# Patient Record
Sex: FEMALE | Race: BLACK OR AFRICAN AMERICAN | Employment: FULL TIME | URBAN - METROPOLITAN AREA
[De-identification: names, ages, dates, MRNs, and addresses within clinical notes are randomized per-mention and may not be internally consistent; named-entity substitution may affect disease eponyms.]

---

## 2017-02-11 ENCOUNTER — HOSPITAL ENCOUNTER (OUTPATIENT)
Dept: ULTRASOUND IMAGING | Age: 32
Discharge: HOME | End: 2017-02-11
Attending: INTERNAL MEDICINE
Payer: COMMERCIAL

## 2017-02-11 DIAGNOSIS — R79.89 ELEVATED LFTS: ICD-10-CM

## 2017-02-11 PROCEDURE — 76705 ECHO EXAM OF ABDOMEN: CPT

## 2017-03-02 ENCOUNTER — HOSPITAL ENCOUNTER (OUTPATIENT)
Dept: ULTRASOUND IMAGING | Age: 32
Discharge: HOME | End: 2017-03-02
Attending: INTERNAL MEDICINE
Payer: COMMERCIAL

## 2017-03-02 VITALS
HEART RATE: 93 BPM | TEMPERATURE: 98.1 F | DIASTOLIC BLOOD PRESSURE: 89 MMHG | SYSTOLIC BLOOD PRESSURE: 122 MMHG | OXYGEN SATURATION: 100 % | RESPIRATION RATE: 16 BRPM | HEIGHT: 60 IN | BODY MASS INDEX: 28.86 KG/M2 | WEIGHT: 147 LBS

## 2017-03-02 DIAGNOSIS — R79.89 ELEVATED LFTS: ICD-10-CM

## 2017-03-02 PROCEDURE — 74011250636 HC RX REV CODE- 250/636: Performed by: RADIOLOGY

## 2017-03-02 PROCEDURE — 88307 TISSUE EXAM BY PATHOLOGIST: CPT | Performed by: INTERNAL MEDICINE

## 2017-03-02 PROCEDURE — 47000 NEEDLE BIOPSY OF LIVER PERQ: CPT

## 2017-03-02 PROCEDURE — 77030003503 HC NDL BIOP TISS BD -B

## 2017-03-02 PROCEDURE — 74011000250 HC RX REV CODE- 250: Performed by: RADIOLOGY

## 2017-03-02 PROCEDURE — 88313 SPECIAL STAINS GROUP 2: CPT | Performed by: INTERNAL MEDICINE

## 2017-03-02 RX ORDER — ALBUTEROL SULFATE 90 UG/1
2 AEROSOL, METERED RESPIRATORY (INHALATION) AS NEEDED
COMMUNITY

## 2017-03-02 RX ORDER — FENTANYL CITRATE 50 UG/ML
200 INJECTION, SOLUTION INTRAMUSCULAR; INTRAVENOUS
Status: DISCONTINUED | OUTPATIENT
Start: 2017-03-02 | End: 2017-03-06 | Stop reason: HOSPADM

## 2017-03-02 RX ORDER — LIDOCAINE HYDROCHLORIDE 10 MG/ML
1 INJECTION INFILTRATION; PERINEURAL
Status: COMPLETED | OUTPATIENT
Start: 2017-03-02 | End: 2017-03-02

## 2017-03-02 RX ORDER — SODIUM CHLORIDE 9 MG/ML
25 INJECTION, SOLUTION INTRAVENOUS CONTINUOUS
Status: DISCONTINUED | OUTPATIENT
Start: 2017-03-02 | End: 2017-03-06 | Stop reason: HOSPADM

## 2017-03-02 RX ORDER — MIDAZOLAM HYDROCHLORIDE 1 MG/ML
5 INJECTION, SOLUTION INTRAMUSCULAR; INTRAVENOUS
Status: DISCONTINUED | OUTPATIENT
Start: 2017-03-02 | End: 2017-03-06 | Stop reason: HOSPADM

## 2017-03-02 RX ADMIN — LIDOCAINE HYDROCHLORIDE 1 ML: 10 INJECTION, SOLUTION INFILTRATION; PERINEURAL at 12:00

## 2017-03-02 RX ADMIN — FENTANYL CITRATE 25 MCG: 50 INJECTION, SOLUTION INTRAMUSCULAR; INTRAVENOUS at 11:10

## 2017-03-02 RX ADMIN — MIDAZOLAM HYDROCHLORIDE 1 MG: 1 INJECTION, SOLUTION INTRAMUSCULAR; INTRAVENOUS at 11:10

## 2017-03-02 RX ADMIN — MIDAZOLAM HYDROCHLORIDE 1 MG: 1 INJECTION, SOLUTION INTRAMUSCULAR; INTRAVENOUS at 11:00

## 2017-03-02 RX ADMIN — FENTANYL CITRATE 25 MCG: 50 INJECTION, SOLUTION INTRAMUSCULAR; INTRAVENOUS at 11:00

## 2017-03-02 RX ADMIN — MIDAZOLAM HYDROCHLORIDE 1 MG: 1 INJECTION, SOLUTION INTRAMUSCULAR; INTRAVENOUS at 11:14

## 2017-03-02 RX ADMIN — FENTANYL CITRATE 25 MCG: 50 INJECTION, SOLUTION INTRAMUSCULAR; INTRAVENOUS at 11:14

## 2017-03-02 RX ADMIN — SODIUM CHLORIDE 25 ML/HR: 900 INJECTION, SOLUTION INTRAVENOUS at 10:15

## 2017-03-02 RX ADMIN — MIDAZOLAM HYDROCHLORIDE 1 MG: 1 INJECTION, SOLUTION INTRAMUSCULAR; INTRAVENOUS at 11:05

## 2017-03-02 NOTE — DISCHARGE INSTRUCTIONS
BIOPSY DISCHARGE INSTRUCTIONS    General Instructions:     A biopsy is the removal of a small piece of tissue for microscopic examination or testing. Healthy tissue can be obtained for the purpose of tissue-type matching for transplants. Unhealthy tissues are more commonly biopsied to diagnose disease. Liver Biopsy: This test helps detect cancer, infections, and the cause of an enlargement of the liver or elevated liver enzymes. It also helps to diagnose a number of liver diseases. The pain associated with the procedure may be felt in the shoulder. The risks include internal bleeding, pneumothorax, and injury to the surrounding organs. Home Care Instructions: You may resume your regular diet and medication regimen. Do not drink alcohol, drive, or make any important legal decisions in the next 24 hours. Do not lift anything heavier than a gallon of milk until the soreness goes away. You may use over the counter acetaminophen or ibuprofen for the soreness. You may apply an ice pack to the affected area for 20-30 minutes at time for the first 24 hours. After that, you may apply a heat pack. Call If:     You should call your Physician and/or the Radiology Nurse if you have any questions or concerns about the biopsy site. Call if you should have increased pain, fever, redness, drainage, or bleeding more than a small spot on the bandage. Follow-Up Instructions: Please see your ordering doctor as he/she has requested. To Reach Us:    Should you experience any significant changes, please call [phone] between the hours of 7:30 am and 10 pm or [facility] number [phone] after hours.  After hours, ask the  to page the [address] Technologist, and describe the problem to the technologist.          Patient Signature:  Date: 3/2/2017  Discharging Nurse: Franck Hand RN

## 2017-03-02 NOTE — H&P
Interventional Radiology History and Physical (Outpatient)    3/2/2017    Patient: Awilda Gale 32 y.o. female     Referring Physician:  Minerva Ni MD    Chief Complaint: need liver bx    History of Present Illness: abnormal liver tests    History:  Past Medical History:   Diagnosis Date    Asthma      History reviewed. No pertinent family history. Social History     Social History    Marital status: SINGLE     Spouse name: N/A    Number of children: N/A    Years of education: N/A     Occupational History    Not on file. Social History Main Topics    Smoking status: Never Smoker    Smokeless tobacco: Not on file    Alcohol use Yes      Comment: rarely    Drug use: Not on file    Sexual activity: Not on file     Other Topics Concern    Not on file     Social History Narrative    No narrative on file       Allergies: Allergies   Allergen Reactions    Penicillin G Angioedema    Sulfa (Sulfonamide Antibiotics) Hives       Prior to Admission Medications:  Prior to Admission medications    Medication Sig Start Date End Date Taking? Authorizing Provider   albuterol (PROAIR HFA) 90 mcg/actuation inhaler Take 2 Puffs by inhalation as needed for Wheezing. Indications: Acute Asthma Attack   Yes Historical Provider       Physical Exam:    Blood pressure 122/89, pulse 93, temperature 98.1 °F (36.7 °C), resp. rate 16, height 5' (1.524 m), weight 66.7 kg (147 lb), SpO2 100 %. General: alert, cooperative, no distress, appears stated age  Heart: rrr  Lungs: clear to auscultation bilaterally  Abdomen: soft  Neuro: grossly intact  Extremities: extremities wnl    Plan of Care/Planned Procedure:  Risks, benefits, and alternatives reviewed with patient and she agrees to proceed with the procedure.      Francisco J Fontaine MD

## 2017-04-04 ENCOUNTER — TELEPHONE (OUTPATIENT)
Dept: HEMATOLOGY | Age: 32
End: 2017-04-04

## 2017-04-10 ENCOUNTER — OFFICE VISIT (OUTPATIENT)
Dept: HEMATOLOGY | Age: 32
End: 2017-04-10

## 2017-04-10 VITALS
BODY MASS INDEX: 28.49 KG/M2 | HEART RATE: 97 BPM | SYSTOLIC BLOOD PRESSURE: 133 MMHG | OXYGEN SATURATION: 99 % | DIASTOLIC BLOOD PRESSURE: 97 MMHG | HEIGHT: 60 IN | TEMPERATURE: 96.9 F | WEIGHT: 145.13 LBS

## 2017-04-10 DIAGNOSIS — K74.3 PRIMARY BILIARY CHOLANGITIS (HCC): Primary | ICD-10-CM

## 2017-04-10 DIAGNOSIS — L29.9 ITCHING: Primary | ICD-10-CM

## 2017-04-10 PROBLEM — J45.909 ASTHMA: Status: ACTIVE | Noted: 2017-04-10

## 2017-04-10 PROBLEM — F32.A DEPRESSION: Status: ACTIVE | Noted: 2017-04-10

## 2017-04-10 RX ORDER — CYPROHEPTADINE HYDROCHLORIDE 4 MG/1
4 TABLET ORAL
Qty: 90 TAB | Refills: 3 | Status: SHIPPED | OUTPATIENT
Start: 2017-04-10 | End: 2017-07-09

## 2017-04-10 RX ORDER — ALPRAZOLAM 0.5 MG/1
TABLET ORAL
COMMUNITY

## 2017-04-10 RX ORDER — ESCITALOPRAM OXALATE 10 MG/1
10 TABLET ORAL DAILY
COMMUNITY

## 2017-04-10 RX ORDER — URSODIOL 300 MG/1
900 CAPSULE ORAL DAILY
Qty: 90 CAP | Refills: 6 | Status: SHIPPED | OUTPATIENT
Start: 2017-04-10 | End: 2017-11-24 | Stop reason: SDUPTHER

## 2017-04-10 NOTE — PROGRESS NOTES
93 Nurys Novak MD, [address]     April JOSH Arrieta PA-C Lucrecia Primmer, MD, [address], MD Munira Rojo NP Rip Snuffer, NP        [address] [facility], [address] [facility], [place]  [address].     [phone]     FAX: [address], [address] [facility], [address]     [phone]     FAX: [phone]       Patient Care Team:  Jose Carty MD as PCP - General (Family Practice)  Melvin Akins MD (Gastroenterology)      Problem List  Date Reviewed: 4/10/2017          Codes Class Noted    Primary biliary cholangitis [facility]) ICD-10-CM: K74.3  ICD-9-CM: 571.6  4/10/2017        Depression ICD-10-CM: F32.9  ICD-9-CM: 893  4/10/2017        Asthma ICD-10-CM: N88.889  ICD-9-CM: 493.90  4/10/2017                The physicians listed above have asked me to see Kajal Gamal in consultation regarding Primary Biliary Cirrhosis. All medical records sent by the referring physicians were reviewed including imaging studies and pathology. The patient is a 32 y.o. Black female who was noted to have abnormalities in liver transaminases and alkaline phosphase in 2/2017. Serologic studies were positive for AMA. Ultrasound of the liver was performed in 2/2017. The results of the imaging demonstrated a normal appearing liver. A liver biopsy was performed in 3/2017. This demonstrated mild inflammation, bile duct changes consistent with PBC and no fibrosis. The patient has been not been treated with ursodeoxycholic acid to date. The most recent laboratory studies indicate that the liver transaminases are elevated, ALP is elevated, tests of hepatic synthetic and metabolic function are   normal, and the platelet count is normal.    The patient notes fatigue, dry eyes, dry mouth, itching, hair loss.     The patient completes all daily activities without any functional limitations. The patient has not experienced yellowing of the eyes or skin, mylagias, arthralgias. ALLERGIES  Allergies   Allergen Reactions    Penicillin G Angioedema    Sulfa (Sulfonamide Antibiotics) Hives       MEDICATIONS  Current Outpatient Prescriptions   Medication Sig    escitalopram oxalate (LEXAPRO) 10 mg tablet Take 10 mg by mouth daily.  ALPRAZolam (XANAX) 0.5 mg tablet Take  by mouth.  albuterol (PROAIR HFA) 90 mcg/actuation inhaler Take 2 Puffs by inhalation as needed for Wheezing. Indications: Acute Asthma Attack     No current facility-administered medications for this visit. SYSTEM REVIEW NOT RELATED TO LIVER DISEASE OR REVIEWED ABOVE:  Constitution systems: Negative for fever, chills, weight gain, weight loss. Eyes: Negative for visual changes. ENT: Negative for sore throat, painful swallowing. Respiratory: Negative for cough, hemoptysis, SOB. Cardiology: Negative for chest pain, palpitations. GI:  Negative for constipation or diarrhea. : Negative for urinary frequency, dysuria, hematuria, nocturia. Skin: Negative for rash. Hematology: Negative for easy bruising, blood clots. Musculo-skelatal: Negative for back pain, muscle pain, weakness. Neurologic: Negative for headaches, dizziness, vertigo, memory problems not related to HE. Psychology: Negative for anxiety, depression. FAMILY HISTORY:  The father has the following chronic diseases: DM and has some sort of liver problem. The mother has the following chronic diseases: CHOL, HTN. There is no family history of immune disorders. SOCIAL HISTORY:  The patient has never been . The patient has no children. The patient has never used tobacco products. The patient consumes alcohol on social occasions never in excess. The patient currently works full time 3rd .         PHYSICAL EXAMINATION:  BP (!) 133/97  Pulse 97  Temp 96.9 °F (36.1 °C) (Tympanic)   Ht 5' (1.524 m)  Wt 145 lb 2 oz (65.8 kg)  SpO2 99%  BMI 28.34 kg/m2  General: No acute distress. Eyes: Sclera anicteric. ENT: No oral lesions. Thyroid normal.  Nodes: No adenopathy. Skin: No spider angiomata. No jaundice. No palmar erythema. Respiratory: Lungs clear to auscultation. Cardiovascular: Regular heart rate. No murmurs. No JVD. Abdomen: Soft non-tender. Liver size normal to percussion/palpation. Spleen not palpable. No obvious ascites. Extremities: No edema. No muscle wasting. No gross arthritic changes. Neurologic: Alert and oriented. Cranial nerves grossly intact. No asterixis. LABORATORY STUDIES:  From 2/2017  AST/ALT/ALP/T Bili/ALB:  116/139/474/0.7/4.1  WBC/HB/PLT/INR:   9.5/10.3/605  BUN/CREAT:  11/0.9    SEROLOGIES:  2/2017. NYASIA positive, ASMA positive, AMA positive. LIVER HISTOLOGY:  3/2017. Portal inflammation. Bile duct injury consistent with PBC. No fibrosis. Biopsy slides not reviewed by MLS. ENDOSCOPIC PROCEDURES:  Not available or performed    RADIOLOGY:  2/2017. Ultrasound of liver. Normal appearing liver. No liver mass lesions. 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 Liver transaminases are elevated. Alkaline phosphate is elevated. Liver function is normal.  The platelet count is normal.      The patient is not currently receiving treatment for the liver disease. Will start MARIBELL at 900 mg every day. The patient had a liver biopsy performed in [facility]. Will request that the liver biopsy slides sent to me for my personal review. At some point will get a Fiboscan which we can repeat every 2 years to assess for lack of fibrosis progression. The patient was directed to continue all current medications at the current dosages.   There are no contraindications for the patient to take any medications that are necessary for treatment of other medical issues. The patient was counseled regarding alcohol consumption. The need for vaccination against viral hepatitis A and B will be assessed with serologic and instituted as appropriate. Anemia may be secondary to low iron stores. Will check ferritin and iron panel. If iron deficient will supplement with oral iron and evaluate for GI blood loss. All of the above issues were discussed with the patient. All questions were answered. The patient expressed a clear understanding of the above. [address] [address] in 4 weeks to assess tolerability of and response to MARIBELL.     Ramón Frederick MD  Liver [place] [address].  [phone]

## 2017-04-10 NOTE — MR AVS SNAPSHOT
Visit Information Date & Time Provider Department Dept. Phone Encounter #  
 4/10/2017  1:30 PM Cally Carvajal MD [facility] of [address] [place] [address] [id number] Follow-up Instructions Return in about 4 weeks (around 5/8/2017) for April. Upcoming Health Maintenance Date Due DTaP/Tdap/Td series (1 - Tdap) 11/26/2006 PAP AKA CERVICAL CYTOLOGY 11/26/2006 INFLUENZA AGE 9 TO ADULT 8/1/2016 Allergies as of 4/10/2017  Review Complete On: 4/10/2017 By: Yadi Ness LPN Severity Noted Reaction Type Reactions Penicillin G  03/02/2017    Angioedema Sulfa (Sulfonamide Antibiotics)  03/02/2017    Hives Current Immunizations  Never Reviewed No immunizations on file. Not reviewed this visit Vitals BP Pulse Temp Height(growth percentile) Weight(growth percentile) SpO2  
 (!) 133/97 97 96.9 °F (36.1 °C) (Tympanic) 5' (1.524 m) 145 lb 2 oz (65.8 kg) 99% BMI Smoking Status 28.34 kg/m2 Never Smoker BMI and BSA Data Body Mass Index Body Surface Area  
 28.34 kg/m 2 1.67 m 2 Preferred Pharmacy Pharmacy Name Phone [facility]/PHARMACY #[id number] SAL [address]/ [address]. [facility] [phone] Your Updated Medication List  
  
   
This list is accurate as of: 4/10/17  2:48 PM.  Always use your most recent med list.  
  
  
  
  
 LEXAPRO 10 mg tablet Generic drug:  escitalopram oxalate Take 10 mg by mouth daily. PROAIR HFA 90 mcg/actuation inhaler Generic drug:  albuterol Take 2 Puffs by inhalation as needed for Wheezing. Indications: Acute Asthma Attack  
  
 ursodiol 300 mg capsule Commonly known as:  ACTIGALL Take 3 Caps by mouth daily. XANAX 0.5 mg tablet Generic drug:  ALPRAZolam  
Take  by mouth. Prescriptions Sent to Pharmacy Refills  
 ursodiol (ACTIGALL) 300 mg capsule 6 Sig: Take 3 Caps by mouth daily. Class: Normal  
 Pharmacy: [facility]/pharmacy #[id number] - [address]  #: [phone] Route: Oral  
  
Follow-up Instructions Return in about 4 weeks (around 5/8/2017) for April. Introducing [facility]! Tanis Epley introduces YouScribe patient portal. Now you can access parts of your medical record, email your doctor's office, and request medication refills online. 1. In your internet browser, go to [email or web address]¢/[email or web address] 2. Click on the First Time User? Click Here link in the Sign In box. You will see the New Member Sign Up page. 3. Enter your YouScribe Access Code exactly as it appears below. You will not need to use this code after youve completed the sign-up process. If you do not sign up before the expiration date, you must request a new code. · YouScribe Access Code: [email or web address] Expires: 5/10/2017  2:18 PM 
 
4. Enter the last four digits of your Social Security Number (xxxx) and Date of Birth (mm/dd/yyyy) as indicated and click Submit. You will be taken to the next sign-up page. 5. Create a YouScribe ID. This will be your YouScribe login ID and cannot be changed, so think of one that is secure and easy to remember. 6. Create a YouScribe password. You can change your password at any time. 7. Enter your Password Reset Question and Answer. This can be used at a later time if you forget your password. 8. Enter your e-mail address. You will receive e-mail notification when new information is available in [address] 9. Click Sign Up. You can now view and download portions of your medical record. 10. Click the Download Summary menu link to download a portable copy of your medical information. If you have questions, please visit the Frequently Asked Questions section of the YouScribe website. Remember, YouScribe is NOT to be used for urgent needs. For medical emergencies, dial 911. Now available from your iPhone and Android! Please provide this summary of care documentation to your next provider. Your primary care clinician is listed as Dion Osuna. If you have any questions after today's visit, please call [phone].

## 2017-06-13 ENCOUNTER — OFFICE VISIT (OUTPATIENT)
Dept: HEMATOLOGY | Age: 32
End: 2017-06-13

## 2017-06-13 VITALS
TEMPERATURE: 98.3 F | OXYGEN SATURATION: 96 % | DIASTOLIC BLOOD PRESSURE: 94 MMHG | BODY MASS INDEX: 28.75 KG/M2 | SYSTOLIC BLOOD PRESSURE: 140 MMHG | WEIGHT: 147.2 LBS | HEART RATE: 90 BPM

## 2017-06-13 DIAGNOSIS — K74.3 PRIMARY BILIARY CHOLANGITIS (HCC): Primary | ICD-10-CM

## 2017-06-13 RX ORDER — CETIRIZINE HCL 10 MG
TABLET ORAL
COMMUNITY

## 2017-06-13 NOTE — MR AVS SNAPSHOT
Visit Information Date & Time Provider Department Dept. Phone Encounter #  
 6/13/2017  2:30 PM MARIA E Montague Liver [facility] [phone] [id number] Follow-up Instructions Return in about 3 months (around 9/13/2017) for [address] Upcoming Health Maintenance Date Due Pneumococcal 19-64 Medium Risk (1 of 1 - PPSV23) 11/26/2004 DTaP/Tdap/Td series (1 - Tdap) 11/26/2006 PAP AKA CERVICAL CYTOLOGY 11/26/2006 INFLUENZA AGE 9 TO ADULT 8/1/2017 Allergies as of 6/13/2017  Review Complete On: 6/13/2017 By: Savannah Dockery Severity Noted Reaction Type Reactions Penicillin G  03/02/2017    Angioedema Sulfa (Sulfonamide Antibiotics)  03/02/2017    Hives Current Immunizations  Never Reviewed No immunizations on file. Not reviewed this visit You Were Diagnosed With   
  
 Codes Comments Primary biliary cholangitis (HCC)    -  Primary ICD-10-CM: A33.3 ICD-9-CM: 571.6 Vitals BP Pulse Temp Weight(growth percentile) SpO2 BMI  
 (!) 140/94 90 98.3 °F (36.8 °C) (Oral) 147 lb 3.2 oz (66.8 kg) 96% 28.75 kg/m2 Smoking Status Never Smoker BMI and BSA Data Body Mass Index Body Surface Area 28.75 kg/m 2 1.68 m 2 Preferred Pharmacy Pharmacy Name Phone [facility]/PHARMACY #[id number] [address] [facility] [phone] Your Updated Medication List  
  
   
This list is accurate as of: 6/13/17  3:39 PM.  Always use your most recent med list.  
  
  
  
  
 cyproheptadine 4 mg tablet Commonly known as:  PERIACTIN Take 1 Tab by mouth three (3) times daily as needed for up to 90 days. LEXAPRO 10 mg tablet Generic drug:  escitalopram oxalate Take 10 mg by mouth daily. PROAIR HFA 90 mcg/actuation inhaler Generic drug:  albuterol Take 2 Puffs by inhalation as needed for Wheezing.  Indications: Acute Asthma Attack  
  
 ursodiol 300 mg capsule Commonly known as:  ACTIGALL Take 3 Caps by mouth daily. XANAX 0.5 mg tablet Generic drug:  ALPRAZolam  
Take  by mouth. ZyrTEC 10 mg tablet Generic drug:  cetirizine Take  by mouth. We Performed the Following CBC W/O DIFF [16978 CPT(R)] HEPATIC FUNCTION PANEL (6) [[id number] Custom] IRON PROFILE [id number] CPT(R)] Follow-up Instructions Return in about 3 months (around 9/13/2017) for [address] To-Do List   
 09/15/2017 1:00 PM  
  Appointment with MARIA E Carvajal at [address] ([phone]) Introducing [facility]! [employer] introduces Shop pirate patient portal. Now you can access parts of your medical record, email your doctor's office, and request medication refills online. 1. In your internet browser, go to [email or web address] 2. Click on the First Time User? Click Here link in the Sign In box. You will see the New Member Sign Up page. 3. Enter your Shop pirate Access Code exactly as it appears below. You will not need to use this code after youve completed the sign-up process. If you do not sign up before the expiration date, you must request a new code. · Shop pirate Access Code: [email or web address] Expires: 9/11/2017  3:32 PM 
 
4. Enter the last four digits of your Social Security Number (xxxx) and Date of Birth (mm/dd/yyyy) as indicated and click Submit. You will be taken to the next sign-up page. 5. Create a Cuet ID. This will be your Shop pirate login ID and cannot be changed, so think of one that is secure and easy to remember. 6. Create a Cuet password. You can change your password at any time. 7. Enter your Password Reset Question and Answer. This can be used at a later time if you forget your password. 8. Enter your e-mail address. You will receive e-mail notification when new information is available in [address] 9. Click Sign Up. You can now view and download portions of your medical record. 10. Click the Download Summary menu link to download a portable copy of your medical information. If you have questions, please visit the Frequently Asked Questions section of the [employer] website. Remember, [employer] is NOT to be used for urgent needs. For medical emergencies, dial 911. Now available from your iPhone and Android! Please provide this summary of care documentation to your next provider. Your primary care clinician is listed as Gregorio Giang. If you have any questions after today's visit, please call [phone].

## 2017-06-14 LAB
ALBUMIN SERPL-MCNC: 3.9 G/DL (ref 3.5–5.5)
ALP SERPL-CCNC: 487 IU/L (ref 39–117)
ALT SERPL-CCNC: 71 IU/L (ref 0–32)
AST SERPL-CCNC: 70 IU/L (ref 0–40)
BILIRUB DIRECT SERPL-MCNC: 0.63 MG/DL (ref 0–0.4)
BILIRUB SERPL-MCNC: 0.9 MG/DL (ref 0–1.2)
ERYTHROCYTE [DISTWIDTH] IN BLOOD BY AUTOMATED COUNT: 18.1 % (ref 12.3–15.4)
HCT VFR BLD AUTO: 34.1 % (ref 34–46.6)
HGB BLD-MCNC: 10.9 G/DL (ref 11.1–15.9)
IRON SATN MFR SERPL: 14 % (ref 15–55)
IRON SERPL-MCNC: 64 UG/DL (ref 27–159)
MCH RBC QN AUTO: 27.7 PG (ref 26.6–33)
MCHC RBC AUTO-ENTMCNC: 32 G/DL (ref 31.5–35.7)
MCV RBC AUTO: 87 FL (ref 79–97)
PLATELET # BLD AUTO: 600 X10E3/UL (ref 150–379)
RBC # BLD AUTO: 3.93 X10E6/UL (ref 3.77–5.28)
TIBC SERPL-MCNC: 454 UG/DL (ref 250–450)
UIBC SERPL-MCNC: 390 UG/DL (ref 131–425)
WBC # BLD AUTO: 9.5 X10E3/UL (ref 3.4–10.8)

## 2017-10-10 ENCOUNTER — APPOINTMENT (OUTPATIENT)
Dept: ULTRASOUND IMAGING | Age: 32
End: 2017-10-10
Attending: PHYSICIAN ASSISTANT
Payer: COMMERCIAL

## 2017-10-10 ENCOUNTER — HOSPITAL ENCOUNTER (EMERGENCY)
Age: 32
Discharge: HOME | End: 2017-10-10
Attending: EMERGENCY MEDICINE
Payer: COMMERCIAL

## 2017-10-10 ENCOUNTER — APPOINTMENT (OUTPATIENT)
Dept: CT IMAGING | Age: 32
End: 2017-10-10
Attending: PHYSICIAN ASSISTANT
Payer: COMMERCIAL

## 2017-10-10 VITALS
BODY MASS INDEX: 28.51 KG/M2 | HEART RATE: 78 BPM | WEIGHT: 145.25 LBS | OXYGEN SATURATION: 96 % | RESPIRATION RATE: 16 BRPM | DIASTOLIC BLOOD PRESSURE: 71 MMHG | TEMPERATURE: 98.4 F | HEIGHT: 60 IN | SYSTOLIC BLOOD PRESSURE: 145 MMHG

## 2017-10-10 DIAGNOSIS — R10.9 RIGHT SIDED ABDOMINAL PAIN: Primary | ICD-10-CM

## 2017-10-10 LAB
ALBUMIN SERPL-MCNC: 3.2 G/DL (ref 3.5–5)
ALBUMIN/GLOB SERPL: 0.5 {RATIO} (ref 1.1–2.2)
ALP SERPL-CCNC: 944 U/L (ref 45–117)
ALT SERPL-CCNC: 140 U/L (ref 12–78)
ANION GAP SERPL CALC-SCNC: 9 MMOL/L (ref 5–15)
AST SERPL-CCNC: 156 U/L (ref 15–37)
BILIRUB SERPL-MCNC: 2.3 MG/DL (ref 0.2–1)
BUN SERPL-MCNC: 7 MG/DL (ref 6–20)
BUN/CREAT SERPL: 8 (ref 12–20)
CALCIUM SERPL-MCNC: 9.1 MG/DL (ref 8.5–10.1)
CHLORIDE SERPL-SCNC: 103 MMOL/L (ref 97–108)
CLUE CELLS VAG QL WET PREP: NORMAL
CO2 SERPL-SCNC: 26 MMOL/L (ref 21–32)
CREAT SERPL-MCNC: 0.84 MG/DL (ref 0.55–1.02)
GLOBULIN SER CALC-MCNC: 6.1 G/DL (ref 2–4)
GLUCOSE SERPL-MCNC: 91 MG/DL (ref 65–100)
KOH PREP SPEC: NORMAL
LIPASE SERPL-CCNC: 270 U/L (ref 73–393)
POTASSIUM SERPL-SCNC: 4 MMOL/L (ref 3.5–5.1)
PROT SERPL-MCNC: 9.3 G/DL (ref 6.4–8.2)
SERVICE CMNT-IMP: NORMAL
SODIUM SERPL-SCNC: 138 MMOL/L (ref 136–145)
T VAGINALIS VAG QL WET PREP: NORMAL

## 2017-10-10 PROCEDURE — 87491 CHLMYD TRACH DNA AMP PROBE: CPT | Performed by: EMERGENCY MEDICINE

## 2017-10-10 PROCEDURE — 87210 SMEAR WET MOUNT SALINE/INK: CPT | Performed by: EMERGENCY MEDICINE

## 2017-10-10 PROCEDURE — 99284 EMERGENCY DEPT VISIT MOD MDM: CPT

## 2017-10-10 PROCEDURE — 80053 COMPREHEN METABOLIC PANEL: CPT | Performed by: PHYSICIAN ASSISTANT

## 2017-10-10 PROCEDURE — 76705 ECHO EXAM OF ABDOMEN: CPT

## 2017-10-10 PROCEDURE — 83690 ASSAY OF LIPASE: CPT | Performed by: PHYSICIAN ASSISTANT

## 2017-10-10 PROCEDURE — 36415 COLL VENOUS BLD VENIPUNCTURE: CPT | Performed by: PHYSICIAN ASSISTANT

## 2017-10-10 RX ORDER — SODIUM CHLORIDE 0.9 % (FLUSH) 0.9 %
10 SYRINGE (ML) INJECTION
Status: DISCONTINUED | OUTPATIENT
Start: 2017-10-10 | End: 2017-10-10 | Stop reason: HOSPADM

## 2017-10-10 RX ORDER — CYPROHEPTADINE HYDROCHLORIDE 4 MG/1
4 TABLET ORAL
COMMUNITY
End: 2018-05-30 | Stop reason: SDUPTHER

## 2017-10-10 NOTE — LETTER
Mike. Sheela 55 
[address] [place], [place] [address] [address] 
[phone] Work/School Note Date: 10/10/2017 To Whom It May concern: 
 
Josué Huffman was seen and treated today in the emergency room by the following provider(s): 
Attending Provider: Sreekanth De Oliveira MD 
Physician Assistant: MARIA E Raymond. Josué Huffman may return to work on 10/12/17. Sincerely, MARIA E Raymond

## 2017-10-10 NOTE — ED TRIAGE NOTES
Right groin pain since this am, denies injury, denies n/v, denies fever, denies vaginal discharge or bleeding , pt stated she had blood work and ua test today and it was negative

## 2017-10-10 NOTE — ED PROVIDER NOTES
HPI Comments: 33 y/o female with PMHx of asthma and primary biliary cholangitis, presenting with complaint of right-sided abdominal pain. She states the pain began around 10:00 this morning, and felt similar to when she had a UTI earlier this year. She was seen at [facility] earlier today and had labs done, including a negative UA and normal WBC. She also admits to not taking her Ursodiol for the past week. The pain was 6/10 earlier but is now 4/10, sharp, non-radiating. Aggravating factors include sitting upright; no alleviating factors. She endorses intermittent diarrhea at baseline. Denies fevers, chills, nausea, vomiting, dysuria, hematuria, urgency, frequency, light-headedness or syncope. The history is provided by the patient. Past Medical History:   Diagnosis Date    Asthma     Primary biliary cholangitis ([facility] [address]        Past Surgical History:   Procedure Laterality Date    HX OTHER SURGICAL      liver bx    HX OTHER SURGICAL      oral surgery          History reviewed. No pertinent family history. Social History     Social History    Marital status: SINGLE     Spouse name: N/A    Number of children: N/A    Years of education: N/A     Occupational History    Not on file. Social History Main Topics    Smoking status: Never Smoker    Smokeless tobacco: Not on file    Alcohol use Yes      Comment: rarely    Drug use: Not on file    Sexual activity: Not on file     Other Topics Concern    Not on file     Social History Narrative         ALLERGIES: Penicillin g and Sulfa (sulfonamide antibiotics)    Review of Systems   Constitutional: Negative for chills and fever. Respiratory: Negative for shortness of breath. Cardiovascular: Negative for chest pain. Gastrointestinal: Positive for abdominal pain and diarrhea (intermittently). Negative for nausea and vomiting. Genitourinary: Negative for dysuria, frequency, hematuria and urgency. Musculoskeletal: Negative for myalgias. Neurological: Negative for syncope and light-headedness. All other systems reviewed and are negative. Vitals:    10/10/17 1424   BP: (!) 152/108   Pulse: 89   Resp: 16   Temp: 98.3 °F (36.8 °C)   SpO2: 95%   Weight: 65.9 kg (145 lb 4 oz)   Height: 5' (1.524 m)            Physical Exam   Constitutional: She is oriented to person, place, and time. She appears well-developed and well-nourished. No distress. HENT:   Head: Normocephalic and atraumatic. Eyes: Conjunctivae and EOM are normal.   Neck: Normal range of motion. Neck supple. Cardiovascular: Normal rate, regular rhythm and normal heart sounds. Pulmonary/Chest: Effort normal and breath sounds normal.   Abdominal: Soft. She exhibits no distension. There is tenderness (RUQ, RLQ). There is no rebound and no guarding. Genitourinary: Pelvic exam was performed with patient supine. There is no rash, tenderness or lesion on the right labia. There is no rash, tenderness or lesion on the left labia. Uterus is not tender. Cervix exhibits no motion tenderness, no discharge and no friability. Right adnexum displays no mass, no tenderness and no fullness. Left adnexum displays no mass, no tenderness and no fullness. No erythema, tenderness or bleeding in the vagina. No signs of injury around the vagina. Vaginal discharge found. Musculoskeletal: Normal range of motion. Neurological: She is alert and oriented to person, place, and time. Skin: Skin is warm and dry. She is not diaphoretic. Nursing note and vitals reviewed.        MDM  Number of Diagnoses or Management Options  Right sided abdominal pain:      Amount and/or Complexity of Data Reviewed  Clinical lab tests: reviewed and ordered  Tests in the radiology section of CPT®: reviewed and ordered  Review and summarize past medical records: yes (Labs, previous Hepatology notes)  Discuss the patient with other providers: yes (Dr. José Luis Granado, ED attending)    Patient Progress  Patient progress: stable    ED Course       Procedures    31 y/o female with PMHx of asthma and primary biliary cholangitis, presenting with complaint of right-sided abdominal pain. Based on history and exam as described above, DDx includes appendicitis, cholecystitis, hepatitis, pancreatitis. No adnexal tenderness, doubt ovarian cyst or torsion. Will obtain CT abd/pelvis w/ contrast, CBC, CMP, lipase, UA, urine preg, wet prep/KOH, GC/Chlamydia. Patient refusing labs, stating she already had labs done at [facility]. Labs from [facility] reviewed, significant for Cr 0.8, bicarb 28, UA with moderate BR but neg nitrites and leuks, WBC 9.1, Hgb 11.2, urine preg negative. Will proceed with CMP, lipase, wet prep, KOH and GC/Chlamydia. Progress note - 4:08 PM  LFT's significantly elevated (t-BR 2.3, , , alk phos 944). Will obtain RUQ ultrasound. Progress note - 5:37 PM  RUQ ultrasound reveals contracted gallbladder but no evidence of cholecystitis, no focal hepatic lesions, no hydronephrosis, no evidence of portal vein hypertension or thrombosis. Will consult Dr. Abimael Bueno (hepatology) to discuss labs and arrange follow up. Progress note - 6:55 PM  Awaiting call back from Dr. Abimael Bueno. Will re-page. Consult note - 7:37 PM  I have spoken with Dr. Abimael Bueno - labs consistent with medication non-compliance, but no explanation of right-sided pain. I reviewed test results with the patient, discussed that we have not ruled out appendicitis. Offered CT abd/pelvis, but the patient states she is hungry and is ready to go home. Agree with discharge disposition with plan for prompt follow up with Dr. Abimael uBeno. We discussed strict ED return precautions including fever, worsening pain, decreased appetite or vomiting. The patient verbalized understanding and agreement with this plan.

## 2017-10-10 NOTE — DISCHARGE INSTRUCTIONS

## 2017-10-11 LAB
C TRACH DNA SPEC QL NAA+PROBE: NEGATIVE
N GONORRHOEA DNA SPEC QL NAA+PROBE: NEGATIVE
SAMPLE TYPE: NORMAL
SERVICE CMNT-IMP: NORMAL
SPECIMEN SOURCE: NORMAL

## 2017-11-07 ENCOUNTER — OFFICE VISIT (OUTPATIENT)
Dept: HEMATOLOGY | Age: 32
End: 2017-11-07

## 2017-11-07 VITALS
HEART RATE: 93 BPM | DIASTOLIC BLOOD PRESSURE: 107 MMHG | WEIGHT: 139.8 LBS | BODY MASS INDEX: 27.3 KG/M2 | SYSTOLIC BLOOD PRESSURE: 141 MMHG | TEMPERATURE: 98.9 F

## 2017-11-07 DIAGNOSIS — K74.3 PRIMARY BILIARY CHOLANGITIS (HCC): Primary | ICD-10-CM

## 2017-11-07 NOTE — MR AVS SNAPSHOT
Visit Information Date & Time Provider Department Dept. Phone Encounter #  
 11/7/2017 11:00 AM Malini Skinner, [address] [place] [address] [id number] Upcoming Health Maintenance Date Due Pneumococcal 19-64 Medium Risk (1 of 1 - PPSV23) 11/26/2004 DTaP/Tdap/Td series (1 - Tdap) 11/26/2006 PAP AKA CERVICAL CYTOLOGY 11/26/2006 Influenza Age 5 to Adult 8/1/2017 Allergies as of 11/7/2017  Review Complete On: 11/7/2017 By: Naeem Ko Severity Noted Reaction Type Reactions Penicillin G  03/02/2017    Angioedema Sulfa (Sulfonamide Antibiotics)  03/02/2017    Hives Current Immunizations  Never Reviewed No immunizations on file. Not reviewed this visit You Were Diagnosed With   
  
 Codes Comments Primary biliary cholangitis (HCC)    -  Primary ICD-10-CM: M61.5 ICD-9-CM: 571.6 Vitals BP Pulse Temp Weight(growth percentile) LMP BMI  
 (!) 141/107 93 98.9 °F (37.2 °C) (Oral) 139 lb 12.8 oz (63.4 kg) 10/05/2017 27.3 kg/m2 Smoking Status Never Smoker Vitals History BMI and BSA Data Body Mass Index Body Surface Area  
 27.3 kg/m 2 1.64 m 2 Preferred Pharmacy Pharmacy Name Phone [id number] [place], [place] - [address] [facility] [phone] Your Updated Medication List  
  
   
This list is accurate as of: 11/7/17 12:31 PM.  Always use your most recent med list.  
  
  
  
  
 cyproheptadine 4 mg tablet Commonly known as:  PERIACTIN Take 4 mg by mouth three (3) times daily as needed. LEXAPRO 10 mg tablet Generic drug:  escitalopram oxalate Take 10 mg by mouth daily. PROAIR HFA 90 mcg/actuation inhaler Generic drug:  albuterol Take 2 Puffs by inhalation as needed for Wheezing. Indications: Acute Asthma Attack  
  
 ursodiol 300 mg capsule Commonly known as:  ACTIGALL Take 3 Caps by mouth daily. XANAX 0.5 mg tablet Generic drug:  ALPRAZolam  
Take  by mouth. ZyrTEC 10 mg tablet Generic drug:  cetirizine Take  by mouth daily as needed. We Performed the Following ANTI-NEUTROPHIL CYTOPLASMIC AB [id number] CPT(R)] CBC W/O DIFF [55806 CPT(R)] HEPATIC FUNCTION PANEL (6) [[id number] Custom] METABOLIC PANEL, BASIC [15644 CPT(R)] Introducing [facility]! [facility] introduces IP Ghoster patient portal. Now you can access parts of your medical record, email your doctor's office, and request medication refills online. 1. In your internet browser, go to [email or web address] 2. Click on the First Time User? Click Here link in the Sign In box. You will see the New Member Sign Up page. 3. Enter your IP Ghoster Access Code exactly as it appears below. You will not need to use this code after youve completed the sign-up process. If you do not sign up before the expiration date, you must request a new code. · IP Ghoster Access Code: [email or web address] Expires: 1/8/2018  6:45 PM 
 
4. Enter the last four digits of your Social Security Number (xxxx) and Date of Birth (mm/dd/yyyy) as indicated and click Submit. You will be taken to the next sign-up page. 5. Create a IP Ghoster ID. This will be your IP Ghoster login ID and cannot be changed, so think of one that is secure and easy to remember. 6. Create a IP Ghoster password. You can change your password at any time. 7. Enter your Password Reset Question and Answer. This can be used at a later time if you forget your password. 8. Enter your e-mail address. You will receive e-mail notification when new information is available in [address] 9. Click Sign Up. You can now view and download portions of your medical record. 10. Click the Download Summary menu link to download a portable copy of your medical information.  
 
If you have questions, please visit the Frequently Asked Questions section of the [employer]. Remember, Gogoyokohart is NOT to be used for urgent needs. For medical emergencies, dial 911. Now available from your iPhone and Android! Please provide this summary of care documentation to your next provider. Your primary care clinician is listed as Kenneth Headley. If you have any questions after today's visit, please call [phone].

## 2017-11-07 NOTE — PROGRESS NOTES
93 Nurys Novak MD, [address], uL[facility]     April JOSH Domingo PA-C Keller Richmond, MD, [address] [place] [address], Frederick Hedge, MD Amy Sybil Scrivener, NP Delphine Felty, NP        [address] [facility], [address] [address] Arias  22.     [phone]     FAX: [address] [place] [address], [address] [place] [address], [address]     [phone]     FAX: [phone]       Patient Care Team:  James Howard MD as PCP - General (Family Practice)  Broderick Cifuentes MD (Gastroenterology)      Problem List  Date Reviewed: 6/13/2017          Codes Class Noted    Primary biliary cholangitis [facility]) ICD-10-CM: K74.3  ICD-9-CM: 571.6  4/10/2017        Depression ICD-10-CM: F32.9  ICD-9-CM: 776  4/10/2017        Asthma ICD-10-CM: J45.909  ICD-9-CM: 493.90  4/10/2017              Carl Arenas returns to the [address] for management of Primary Biliary Cholangitis. The patient is a 32 y.o. Black female who was noted to have abnormalities in liver transaminases and alkaline phosphase in 2/2017. Serologic studies were positive for AMA. Ultrasound of the liver was performed in 2/2017. The results of the imaging demonstrated a normal appearing liver. A liver biopsy was performed in 3/2017. This demonstrated mild inflammation, bile duct changes consistent with PBC and no fibrosis. The patient has been given a new prescription for ursodeoxycholic acid and started this medication in early 5/2017. At her last office visit in 6/2017, there had not been much change in her liver transaminases, but she had only been on the new MARIBELL dose of 900 mg daily for 5 weeks. She had initially reported tolerating reasonably well, but had noted an increase in her baseline symptoms of itching.   I had given her a new prescription for Periactin that she has found to be helpful when she takes at night. Patient relates that over the past several months, she has not been consistent with administration of MARIBELL on a daily basis. She has had less abdominal pain. She estimates that she would take her medications 3-4 days of the week. Itching has remained a strong component. In early 10/2017, she had significant abdominal pain and was concerned and presented first to her PCP and then was sent to the EMD for evaluation. This showed marked increase in her liver enzymes and total bilirubin. She had negative ultrasound at that time. Patient was discharged and advised to resume her MARIBELL which she had stopped taking about a week prior to her hospital evaluation. She reports since 10/10/2017, she has been consistent with administration of the medication. She has continued to have increased itching, particularly of the palms and soles of the feet. The most recent laboratory studies indicate that the liver transaminases are elevated, ALP is elevated, tests of hepatic synthetic and metabolic function are depressed, and the platelet count is normal.    The patient notes fatigue, dry eyes, dry mouth, itching, hair loss. The patient completes all daily activities without any functional limitations. The patient has not experienced yellowing of the eyes or skin, mylagias, arthralgias. She admits that she has had a hard time coming to terms with her new diagnosis of chronic liver disease and has recently started on Lexapro for management of symptoms. ALLERGIES  Allergies   Allergen Reactions    Penicillin G Angioedema    Sulfa (Sulfonamide Antibiotics) Hives       MEDICATIONS  Current Outpatient Prescriptions   Medication Sig    cyproheptadine (PERIACTIN) 4 mg tablet Take 4 mg by mouth three (3) times daily as needed.  cetirizine (ZYRTEC) 10 mg tablet Take  by mouth daily as needed.  escitalopram oxalate (LEXAPRO) 10 mg tablet Take 10 mg by mouth daily.     ALPRAZolam (XANAX) 0.5 mg tablet Take  by mouth.  ursodiol (ACTIGALL) 300 mg capsule Take 3 Caps by mouth daily.  albuterol (PROAIR HFA) 90 mcg/actuation inhaler Take 2 Puffs by inhalation as needed for Wheezing. Indications: Acute Asthma Attack     No current facility-administered medications for this visit. SYSTEM REVIEW NOT RELATED TO LIVER DISEASE OR REVIEWED ABOVE:  Constitution systems: Negative for fever, chills, weight gain, weight loss. Eyes: Negative for visual changes. ENT: Negative for sore throat, painful swallowing. Respiratory: Negative for cough, hemoptysis, SOB. Cardiology: Negative for chest pain, palpitations. GI:  Negative for constipation or diarrhea. : Negative for urinary frequency, dysuria, hematuria, nocturia. Skin: Negative for rash. Positive for itching. Hematology: Negative for easy bruising, blood clots. Musculo-skeletal: Negative for back pain, muscle pain, weakness. Neurologic: Negative for headaches, dizziness, vertigo, memory problems not related to HE. Psychology: Negative for anxiety, depression. FAMILY HISTORY:  The father has the following chronic diseases: DM and has some sort of liver problem. The mother has the following chronic diseases: CHOL, HTN. There is no family history of immune disorders. SOCIAL HISTORY:  The patient has never been . The patient has no children. The patient has never used tobacco products. The patient consumes alcohol on social occasions never in excess. The patient currently works full time, 3rd . PHYSICAL EXAMINATION:  BP (!) 141/107  Pulse 93  Temp 98.9 °F (37.2 °C) (Oral)   Wt 139 lb 12.8 oz (63.4 kg)  LMP 10/05/2017  BMI 27.3 kg/m2  General: No acute distress. Eyes: Sclera anicteric. ENT: No oral lesions. Thyroid normal.  Nodes: No adenopathy. Skin: No spider angiomata. No jaundice. No palmar erythema. Respiratory: Lungs clear to auscultation.    Cardiovascular: Regular heart rate. No murmurs. No JVD. Abdomen: Soft non-tender. Liver size normal to percussion/palpation. Spleen not palpable. No obvious ascites. Extremities: No edema. No muscle wasting. No gross arthritic changes. Neurologic: Alert and oriented. Cranial nerves grossly intact. No asterixis. LABORATORY STUDIES:  [facility] [place] [facility] [address] 10/10/2017 6/13/2017   WBC 3.4 - 10.8 x10E3/uL  9.5   HGB 11.1 - 15.9 g/dL  10.9 (L)    - 379 x10E3/uL  600 (H)   AST 15 - 37 U/L 156 (H) 70 (H)   ALT 12 - 78 U/L 140 (H) 71 (H)   Alk Phos 45 - 117 U/L 944 (H) 487 (H)   Bili, Total 0.2 - 1.0 MG/DL 2.3 (H) 0.9   Bili, Direct 0.00 - 0.40 mg/dL  0.63 (H)   Albumin 3.5 - 5.0 g/dL 3.2 (L) 3.9   BUN 6 - 20 MG/DL 7    Creat 0.55 - 1.02 MG/DL 0.84    Na 136 - 145 mmol/L 138    K 3.5 - 5.1 mmol/L 4.0    Cl 97 - 108 mmol/L 103    CO2 21 - 32 mmol/L 26    Glucose 65 - 100 mg/dL 91    From 2/2017  AST/ALT/ALP/T Bili/ALB:  116/139/474/0.7/4.1  WBC/HB/PLT/INR:   9.5/10.3/605  BUN/CREAT:  11/0.9    Additional lab values drawn at today's office visit are pending at the time of documentation. SEROLOGIES:  2/2017. NYASIA positive, ASMA positive, AMA positive. LIVER HISTOLOGY:  3/2017. Portal inflammation. Bile duct injury consistent with PBC. No fibrosis. Biopsy slides not reviewed by MLS. ENDOSCOPIC PROCEDURES:  Not available or performed    RADIOLOGY:  2/2017. Ultrasound of liver. Normal appearing liver. No liver mass lesions. 10/2017. Ultrasound of liver. Echogenic consistent with chronic liver disease. No liver mass lesions. No dilated bile ducts. No ascites. No evidence of biliary obstruction or stone. OTHER TESTING:  Not available or performed    ASSESSMENT AND PLAN:  Primary Biliary Cholangitis with no fibrosis. Liver transaminases are recently markedly elevated. Alkaline phosphate is elevated.   Liver function is normal.  The platelet count is normal.  These lab values will be repeated today to assess relative values to her ER presentation last month. She has resumed daily dosing of MARIBELL. I have advised that if there is a lack of response to this medication, we may need to pursue additional imaging with MRCP or add another medication for improved control of values. This could include Francee Scripture or participation in clinical trial.  Will review her labs with Dr Leonardo Wall when available. Hypertension. Patient has been noted on several past occasions of her elevation in value. She states that her PCP is aware and has been following this with her. No medications have been instituted as of yet. The patient was directed to continue all current medications at the current dosages. There are no contraindications for the patient to take any medications that are necessary for treatment of other medical issues. The patient was counseled regarding alcohol consumption. The need for vaccination against viral hepatitis A and B will be assessed with serologic and instituted as appropriate. Anemia may be secondary to low iron stores. This has been shown in the past.  I have advised that she should remain on oral supplementation. Oral supplementation. I have reviewed with her the recommendations to supplement calcium and vitamin D. I spent an extended amount of time in the office, ~45 minutes, reviewing the course of her recent hospital presentation and potential outcomes of medical management with the patient. We have reviewed extensively the course and condition of the natural history of PBC and what she can expect long term for follow-up and care. All of the above issues were discussed with the patient. All questions were answered. The patient expressed a clear understanding of the above. [facility] in 2 months pending review of recent labs.     Malini Calvo PA-C  [facility] [place] [facility] [address] [facility] Mushtaq Lara  22.  [phone]

## 2017-11-08 LAB
ALBUMIN SERPL-MCNC: 3.9 G/DL (ref 3.5–5.5)
ALP SERPL-CCNC: 501 IU/L (ref 39–117)
ALT SERPL-CCNC: 114 IU/L (ref 0–32)
AST SERPL-CCNC: 117 IU/L (ref 0–40)
BILIRUB DIRECT SERPL-MCNC: 1.24 MG/DL (ref 0–0.4)
BILIRUB SERPL-MCNC: 1.7 MG/DL (ref 0–1.2)
BUN SERPL-MCNC: 10 MG/DL (ref 6–20)
BUN/CREAT SERPL: 13 (ref 9–23)
C-ANCA TITR SER IF: NORMAL TITER
CALCIUM SERPL-MCNC: 9.2 MG/DL (ref 8.7–10.2)
CHLORIDE SERPL-SCNC: 99 MMOL/L (ref 96–106)
CO2 SERPL-SCNC: 25 MMOL/L (ref 18–29)
CREAT SERPL-MCNC: 0.77 MG/DL (ref 0.57–1)
ERYTHROCYTE [DISTWIDTH] IN BLOOD BY AUTOMATED COUNT: 17.2 % (ref 12.3–15.4)
GFR SERPLBLD CREATININE-BSD FMLA CKD-EPI: 103 ML/MIN/1.73
GFR SERPLBLD CREATININE-BSD FMLA CKD-EPI: 119 ML/MIN/1.73
GLUCOSE SERPL-MCNC: 93 MG/DL (ref 65–99)
HCT VFR BLD AUTO: 34.2 % (ref 34–46.6)
HGB BLD-MCNC: 10.9 G/DL (ref 11.1–15.9)
MCH RBC QN AUTO: 27 PG (ref 26.6–33)
MCHC RBC AUTO-ENTMCNC: 31.9 G/DL (ref 31.5–35.7)
MCV RBC AUTO: 85 FL (ref 79–97)
P-ANCA ATYPICAL TITR SER IF: NORMAL TITER
P-ANCA TITR SER IF: NORMAL TITER
PLATELET # BLD AUTO: 504 X10E3/UL (ref 150–379)
POTASSIUM SERPL-SCNC: 4.6 MMOL/L (ref 3.5–5.2)
RBC # BLD AUTO: 4.03 X10E6/UL (ref 3.77–5.28)
SODIUM SERPL-SCNC: 139 MMOL/L (ref 134–144)
WBC # BLD AUTO: 10.2 X10E3/UL (ref 3.4–10.8)

## 2017-11-10 NOTE — PROGRESS NOTES
Pt notified of reduction in values with restart of MARIBELL. Continue with present dosing, per MLS no indication for MRC at this time unless loses response to MARIBELL. RTC in 1/2018 as scheduled.

## 2017-11-27 RX ORDER — URSODIOL 300 MG/1
900 CAPSULE ORAL DAILY
Qty: 90 CAP | Refills: 6 | Status: SHIPPED | OUTPATIENT
Start: 2017-11-27 | End: 2017-11-28 | Stop reason: SDUPTHER

## 2017-11-28 RX ORDER — URSODIOL 300 MG/1
900 CAPSULE ORAL DAILY
Qty: 90 CAP | Refills: 6 | Status: SHIPPED | OUTPATIENT
Start: 2017-11-28 | End: 2018-08-29 | Stop reason: SDUPTHER

## 2018-02-21 ENCOUNTER — OFFICE VISIT (OUTPATIENT)
Dept: HEMATOLOGY | Age: 33
End: 2018-02-21

## 2018-02-21 VITALS
OXYGEN SATURATION: 98 % | BODY MASS INDEX: 26.31 KG/M2 | SYSTOLIC BLOOD PRESSURE: 140 MMHG | HEART RATE: 84 BPM | DIASTOLIC BLOOD PRESSURE: 98 MMHG | HEIGHT: 60 IN | WEIGHT: 134 LBS | TEMPERATURE: 98.2 F | RESPIRATION RATE: 18 BRPM

## 2018-02-21 DIAGNOSIS — K74.3 PRIMARY BILIARY CHOLANGITIS (HCC): Primary | ICD-10-CM

## 2018-02-21 NOTE — MR AVS SNAPSHOT
[address] [id number] [address] [place] [address] 
[phone] Patient: Ritu Gordon MRN: [id number] :1985 Visit Information Date & Time Provider Department Dept. Phone Encounter #  
 2018 11:30 AM Brian Galvez, [address] [facility] [address] [place] [address] [id number] Upcoming Health Maintenance Date Due Pneumococcal 19-64 Medium Risk (1 of 1 - PPSV23) 2004 DTaP/Tdap/Td series (1 - Tdap) 2006 PAP AKA CERVICAL CYTOLOGY 2006 Influenza Age 5 to Adult 2017 Allergies as of 2018  Review Complete On: 2018 By: Mona Najera Severity Noted Reaction Type Reactions Penicillin G  2017    Angioedema Sulfa (Sulfonamide Antibiotics)  2017    Hives Current Immunizations  Never Reviewed No immunizations on file. Not reviewed this visit You Were Diagnosed With   
  
 Codes Comments Primary biliary cholangitis (HCC)    -  Primary ICD-10-CM: B84.9 ICD-9-CM: 571.6 Vitals BP Pulse Temp Resp Height(growth percentile) (!) 140/98 (BP 1 Location: Left arm, BP Patient Position: Sitting) 84 98.2 °F (36.8 °C) (Tympanic) 18 5' (1.524 m) Weight(growth percentile) SpO2 BMI Smoking Status 134 lb (60.8 kg) 98% 26.17 kg/m2 Never Smoker Vitals History BMI and BSA Data Body Mass Index Body Surface Area  
 26.17 kg/m 2 1.6 m 2 Preferred Pharmacy Pharmacy Name Phone [id number] Lj Morin, [address] [facility] [phone] Your Updated Medication List  
  
   
This list is accurate as of 18 12:25 PM.  Always use your most recent med list.  
  
  
  
  
 cyproheptadine 4 mg tablet Commonly known as:  PERIACTIN Take 4 mg by mouth three (3) times daily as needed. LEXAPRO 10 mg tablet Generic drug:  escitalopram oxalate Take 10 mg by mouth daily. PROAIR HFA 90 mcg/actuation inhaler Generic drug:  albuterol Take 2 Puffs by inhalation as needed for Wheezing. Indications: Acute Asthma Attack  
  
 ursodiol 300 mg capsule Commonly known as:  ACTIGALL Take 3 Caps by mouth daily. XANAX 0.5 mg tablet Generic drug:  ALPRAZolam  
Take  by mouth. ZyrTEC 10 mg tablet Generic drug:  cetirizine Take  by mouth daily as needed. We Performed the Following CBC W/O DIFF [10310 CPT(R)] FERRITIN [83755 CPT(R)] HEPATIC FUNCTION PANEL (6) [[id number] Custom] IRON PROFILE [id number] CPT(R)] METABOLIC PANEL, BASIC [41642 CPT(R)] Introducing [facility]! Yaakov Bonner introduces Discovery Machine patient portal. Now you can access parts of your medical record, email your doctor's office, and request medication refills online. 1. In your internet browser, go to [email or web address] 2. Click on the First Time User? Click Here link in the Sign In box. You will see the New Member Sign Up page. 3. Enter your Discovery Machine Access Code exactly as it appears below. You will not need to use this code after youve completed the sign-up process. If you do not sign up before the expiration date, you must request a new code. · Discovery Machine Access Code: [email or web address] Expires: 5/22/2018 12:25 PM 
 
4. Enter the last four digits of your Social Security Number (xxxx) and Date of Birth (mm/dd/yyyy) as indicated and click Submit. You will be taken to the next sign-up page. 5. Create a Discovery Machine ID. This will be your Discovery Machine login ID and cannot be changed, so think of one that is secure and easy to remember. 6. Create a Discovery Machine password. You can change your password at any time. 7. Enter your Password Reset Question and Answer. This can be used at a later time if you forget your password. 8. Enter your e-mail address. You will receive e-mail notification when new information is available in [address] 9. Click Sign Up. You can now view and download portions of your medical record. 10. Click the Download Summary menu link to download a portable copy of your medical information. If you have questions, please visit the Frequently Asked Questions section of the Identiv website. Remember, Identiv is NOT to be used for urgent needs. For medical emergencies, dial 911. Now available from your iPhone and Android! Please provide this summary of care documentation to your next provider. Your primary care clinician is listed as Queenie Gil. If you have any questions after today's visit, please call [phone].

## 2018-02-21 NOTE — PROGRESS NOTES
1. Have you been to the ER, urgent care clinic since your last visit? Hospitalized since your last visit? No    2. Have you seen or consulted any other health care providers outside of the [address] [place] [address] since your last visit? Include any pap smears or colon screening.  No     Chief Complaint   Patient presents with    Follow-up

## 2018-02-22 LAB
ALBUMIN SERPL-MCNC: 3.8 G/DL (ref 3.5–5.5)
ALP SERPL-CCNC: 470 IU/L (ref 39–117)
ALT SERPL-CCNC: 88 IU/L (ref 0–32)
AST SERPL-CCNC: 78 IU/L (ref 0–40)
BILIRUB DIRECT SERPL-MCNC: 1.03 MG/DL (ref 0–0.4)
BILIRUB SERPL-MCNC: 1.5 MG/DL (ref 0–1.2)
BUN SERPL-MCNC: 10 MG/DL (ref 6–20)
BUN/CREAT SERPL: 14 (ref 9–23)
CALCIUM SERPL-MCNC: 9.5 MG/DL (ref 8.7–10.2)
CHLORIDE SERPL-SCNC: 100 MMOL/L (ref 96–106)
CO2 SERPL-SCNC: 23 MMOL/L (ref 18–29)
CREAT SERPL-MCNC: 0.74 MG/DL (ref 0.57–1)
ERYTHROCYTE [DISTWIDTH] IN BLOOD BY AUTOMATED COUNT: 17.5 % (ref 12.3–15.4)
FERRITIN SERPL-MCNC: 59 NG/ML (ref 15–150)
GFR SERPLBLD CREATININE-BSD FMLA CKD-EPI: 108 ML/MIN/{1.73_M2}
GFR SERPLBLD CREATININE-BSD FMLA CKD-EPI: 124 ML/MIN/{1.73_M2}
GLUCOSE SERPL-MCNC: 92 MG/DL (ref 65–99)
HCT VFR BLD AUTO: 34.6 % (ref 34–46.6)
HGB BLD-MCNC: 11 G/DL (ref 11.1–15.9)
IRON SATN MFR SERPL: 20 % (ref 15–55)
IRON SERPL-MCNC: 90 UG/DL (ref 27–159)
MCH RBC QN AUTO: 27 PG (ref 26.6–33)
MCHC RBC AUTO-ENTMCNC: 31.8 G/DL (ref 31.5–35.7)
MCV RBC AUTO: 85 FL (ref 79–97)
PLATELET # BLD AUTO: 521 X10E3/UL (ref 150–379)
POTASSIUM SERPL-SCNC: 4.5 MMOL/L (ref 3.5–5.2)
RBC # BLD AUTO: 4.07 X10E6/UL (ref 3.77–5.28)
SODIUM SERPL-SCNC: 138 MMOL/L (ref 134–144)
TIBC SERPL-MCNC: 455 UG/DL (ref 250–450)
UIBC SERPL-MCNC: 365 UG/DL (ref 131–425)
WBC # BLD AUTO: 8 X10E3/UL (ref 3.4–10.8)

## 2018-02-23 NOTE — PROGRESS NOTES
Reviewed labs with Dr Donald Stapleton, continued improvement, will keep on MARIBELL dosing and follow-up as schedule in 5/2018. If continued elevation, could be considered for clinical trial vs Ocaliva. Pt notified to her voicemail.

## 2018-05-30 ENCOUNTER — OFFICE VISIT (OUTPATIENT)
Dept: HEMATOLOGY | Age: 33
End: 2018-05-30

## 2018-05-30 VITALS
SYSTOLIC BLOOD PRESSURE: 131 MMHG | BODY MASS INDEX: 26.52 KG/M2 | TEMPERATURE: 98.7 F | WEIGHT: 135.8 LBS | DIASTOLIC BLOOD PRESSURE: 87 MMHG | HEART RATE: 85 BPM | OXYGEN SATURATION: 97 %

## 2018-05-30 DIAGNOSIS — K74.3 PRIMARY BILIARY CHOLANGITIS (HCC): Primary | ICD-10-CM

## 2018-05-30 RX ORDER — CYPROHEPTADINE HYDROCHLORIDE 4 MG/1
4 TABLET ORAL
Qty: 60 TAB | Refills: 11 | Status: SHIPPED | OUTPATIENT
Start: 2018-05-30

## 2018-05-30 NOTE — MR AVS SNAPSHOT
[address] [facility] [id number] [address] 
[phone] Patient: David Khan MRN: [id number] :1985 Visit Information Date & Time Provider Department Dept. Phone Encounter #  
 2018  2:30 PM Mellissa Boas, [address] [place] [address] [id number] Follow-up Instructions Return in about 2 months (around 2018) for César Dempsey. Upcoming Health Maintenance Date Due Pneumococcal 19-64 Medium Risk (1 of 1 - PPSV23) 2004 DTaP/Tdap/Td series (1 - Tdap) 2006 PAP AKA CERVICAL CYTOLOGY 2006 Influenza Age 5 to Adult 2018 Allergies as of 2018  Review Complete On: 2018 By: Mellissa Boas, PA Severity Noted Reaction Type Reactions Penicillin G  2017    Angioedema Sulfa (Sulfonamide Antibiotics)  2017    Hives Current Immunizations  Never Reviewed No immunizations on file. Not reviewed this visit You Were Diagnosed With   
  
 Codes Comments Primary biliary cholangitis (HCC)    -  Primary ICD-10-CM: Q71.4 ICD-9-CM: 571.6 Vitals BP Pulse Temp Weight(growth percentile) SpO2 BMI  
 131/87 (BP 1 Location: Right arm, BP Patient Position: Sitting) 85 98.7 °F (37.1 °C) (Tympanic) 135 lb 12.8 oz (61.6 kg) 97% 26.52 kg/m2 Smoking Status Never Smoker BMI and BSA Data Body Mass Index Body Surface Area  
 26.52 kg/m 2 1.61 m 2 Preferred Pharmacy Pharmacy Name Phone [id number] Prakash Friend, [address] [facility] [phone] Your Updated Medication List  
  
   
This list is accurate as of 18  3:25 PM.  Always use your most recent med list.  
  
  
  
  
 cyproheptadine 4 mg tablet Commonly known as:  PERIACTIN Take 1 Tab by mouth two (2) times daily as needed. LEXAPRO 10 mg tablet Generic drug:  escitalopram oxalate Take 10 mg by mouth daily. PROAIR HFA 90 mcg/actuation inhaler Generic drug:  albuterol Take 2 Puffs by inhalation as needed for Wheezing. Indications: Acute Asthma Attack  
  
 ursodiol 300 mg capsule Commonly known as:  ACTIGALL Take 3 Caps by mouth daily. XANAX 0.5 mg tablet Generic drug:  ALPRAZolam  
Take  by mouth. ZyrTEC 10 mg tablet Generic drug:  cetirizine Take  by mouth daily as needed. Prescriptions Sent to Pharmacy Refills  
 cyproheptadine (PERIACTIN) 4 mg tablet 11 Sig: Take 1 Tab by mouth two (2) times daily as needed. Class: Normal  
 [facility], [address] [place] [address] Ph #: [phone] Route: Oral  
  
We Performed the Following ANGIOTENSIN CONVERTING ENZYME [id number] CPT(R)] CBC W/O DIFF [89064 CPT(R)] HEPATIC FUNCTION PANEL (6) [[id number] Custom] METABOLIC PANEL, BASIC [27467 CPT(R)] PROTHROMBIN TIME + INR [23855 CPT(R)] Follow-up Instructions Return in about 2 months (around 7/30/2018) for César Dempsey. Introducing [facility]! Mega Hutchinson introduces Culturalite patient portal. Now you can access parts of your medical record, email your doctor's office, and request medication refills online. 1. In your internet browser, go to [email or web address] 2. Click on the First Time User? Click Here link in the Sign In box. You will see the New Member Sign Up page. 3. Enter your Culturalite Access Code exactly as it appears below. You will not need to use this code after youve completed the sign-up process. If you do not sign up before the expiration date, you must request a new code. · Culturalite Access Code: [email or web address] Expires: 8/28/2018  3:23 PM 
 
4. Enter the last four digits of your Social Security Number (xxxx) and Date of Birth (mm/dd/yyyy) as indicated and click Submit. You will be taken to the next sign-up page. 5. Create a Lagou ID. This will be your Lagou login ID and cannot be changed, so think of one that is secure and easy to remember. 6. Create a Lagou password. You can change your password at any time. 7. Enter your Password Reset Question and Answer. This can be used at a later time if you forget your password. 8. Enter your e-mail address. You will receive e-mail notification when new information is available in [address] 9. Click Sign Up. You can now view and download portions of your medical record. 10. Click the Download Summary menu link to download a portable copy of your medical information. If you have questions, please visit the Frequently Asked Questions section of the Lagou website. Remember, Lagou is NOT to be used for urgent needs. For medical emergencies, dial 911. Now available from your iPhone and Android! Please provide this summary of care documentation to your next provider. Your primary care clinician is listed as Lea Candelario. If you have any questions after today's visit, please call [phone].

## 2018-05-30 NOTE — PROGRESS NOTES
93 Nurys Novak MD, [address], Luisa Gunn Slim     April JOSH Reyes PA-C Collis Needs, MD, [address], MD Munira Barbosa NP Marybeth Marsh, NP        [address] [facility], [address] [address]  22.     [phone]     FAX: [address] [place] [address], [address], [address]     [phone]     FAX: [phone]       Patient Care Team:  Rajwinder Davidson DO as PCP - General (Family Practice)  Raudel Wheatley MD (Gastroenterology)      Problem List  Date Reviewed: 2/21/2018          Codes Class Noted    Primary biliary cholangitis [facility]) ICD-10-CM: K74.3  ICD-9-CM: 571.6  4/10/2017        Depression ICD-10-CM: F32.9  ICD-9-CM: 298  4/10/2017        Asthma ICD-10-CM: J45.909  ICD-9-CM: 493.90  4/10/2017                Clarita Osei returns to the [address] for management of Primary Biliary Cholangitis. The patient is a 28 y.o. Black female who was noted to have abnormalities in liver transaminases and alkaline phosphase in 2/2017. Serologic studies were positive for AMA. Ultrasound of the liver was performed in 2/2017. The results of the imaging demonstrated a normal appearing liver. A liver biopsy was performed in 3/2017. This demonstrated mild inflammation, bile duct changes consistent with PBC and no fibrosis. The patient has been given a new prescription for ursodeoxycholic acid and started this medication in early 5/2017. At her last office visit in 6/2017, there had not been much change in her liver transaminases, but she had only been on the new MARIBELL dose of 900 mg daily for 5 weeks. She had initially reported tolerating reasonably well, but had noted an increase in her baseline symptoms of itching.   A prescription for Periactin taken twice a day has proven to be helpful with the itching thus far. Patient relates that over the Summer months of 2017, she was not consistent with administration of MARIBELL on a daily basis. She estimates that she would take her medications 3-4 days of the week. In early 10/2017, she had significant abdominal pain and was concerned and presented first to her PCP and then was sent to the EMD for evaluation. This showed marked increase in her liver enzymes and total bilirubin. She had negative ultrasound at that time. Patient was discharged and advised to resume her MARIBELL which she had stopped taking about a week prior to her hospital evaluation. She reports since 10/10/2017, she has been consistent with administration of the medication. She is tolerating this reasonably well and past labs have shown a decline in liver enzymes, but we have not yet reached target response. She has had increased bowel frequency with administration of the MARIBELL, she has better managed this with taking her medications in the evening. The most recent laboratory studies indicate that the liver transaminases are elevated, ALP is elevated, tests of hepatic synthetic and metabolic function are depressed, and the platelet count is normal.    The patient notes fatigue, dry eyes, dry mouth, itching, and most recently, increases tooth sensitivity. The patient completes all daily activities without any functional limitations. The patient has not experienced yellowing of the eyes or skin, mylagias, arthralgias. She admits that she has had a hard time coming to terms with her new diagnosis of chronic liver disease and has recently started on Lexapro for management of symptoms. ALLERGIES  Allergies   Allergen Reactions    Penicillin G Angioedema    Sulfa (Sulfonamide Antibiotics) Hives       MEDICATIONS  Current Outpatient Prescriptions   Medication Sig    ursodiol (ACTIGALL) 300 mg capsule Take 3 Caps by mouth daily.     cyproheptadine (PERIACTIN) 4 mg tablet Take 4 mg by mouth three (3) times daily as needed.  cetirizine (ZYRTEC) 10 mg tablet Take  by mouth daily as needed.  escitalopram oxalate (LEXAPRO) 10 mg tablet Take 10 mg by mouth daily.  ALPRAZolam (XANAX) 0.5 mg tablet Take  by mouth.  albuterol (PROAIR HFA) 90 mcg/actuation inhaler Take 2 Puffs by inhalation as needed for Wheezing. Indications: Acute Asthma Attack     No current facility-administered medications for this visit. SYSTEM REVIEW NOT RELATED TO LIVER DISEASE OR REVIEWED ABOVE:  Constitution systems: Negative for fever, chills, weight gain, weight loss. Positive for fatigue. Eyes: Negative for visual changes. ENT: Negative for sore throat, painful swallowing. Noted increased dental sensitivity. Respiratory: Negative for cough, hemoptysis, SOB. Cardiology: Negative for chest pain, palpitations. GI:  Negative for constipation or diarrhea. : Negative for urinary frequency, dysuria, hematuria, nocturia. Skin: Negative for rash. Positive for itching. Hematology: Negative for easy bruising, blood clots. Musculo-skeletal: Negative for back pain, muscle pain, weakness. Neurologic: Negative for headaches, dizziness, vertigo, memory problems not related to HE. Psychology: Negative for anxiety, depression. FAMILY HISTORY:  The father has the following chronic diseases: DM and has some sort of liver problem. The mother has the following chronic diseases: CHOL, HTN. There is no family history of immune disorders. SOCIAL HISTORY:  The patient has never been . The patient has no children. The patient has never used tobacco products. The patient consumes alcohol on social occasions never in excess. The patient currently works full time, 3rd .       PHYSICAL EXAMINATION:  /87 (BP 1 Location: Right arm, BP Patient Position: Sitting)  Pulse 85  Temp 98.7 °F (37.1 °C) (Tympanic)   Wt 135 lb 12.8 oz (61.6 kg)  SpO2 97%  BMI 26.52 kg/m2  General: No acute distress. Eyes: Sclera anicteric. ENT: No oral lesions. Thyroid normal.  Nodes: No adenopathy. Skin: No spider angiomata. No jaundice. No palmar erythema. Respiratory: Lungs clear to auscultation. Cardiovascular: Regular heart rate. No murmurs. No JVD. Abdomen: Soft non-tender. Liver size normal to percussion/palpation. Spleen not palpable. No obvious ascites. Extremities: No edema. No muscle wasting. No gross arthritic changes. Neurologic: Alert and oriented. Cranial nerves grossly intact. No asterixis. LABORATORY STUDIES:  [place] [facility] [address]  [address] Units 2/21/2018 11/7/2017   WBC 3.4 - 10.8 x10E3/uL 8.0 10.2   HGB 11.1 - 15.9 g/dL 11.0 (L) 10.9 (L)    - 379 x10E3/uL 521 (H) 504 (H)   AST 0 - 40 IU/L 78 (H) 117 (H)   ALT 0 - 32 IU/L 88 (H) 114 (H)   Alk Phos 39 - 117 IU/L 470 (H) 501 (H)   Bili, Total 0.0 - 1.2 mg/dL 1.5 (H) 1.7 (H)   Bili, Direct 0.00 - 0.40 mg/dL 1.03 (H) 1.24 (H)   Albumin 3.5 - 5.5 g/dL 3.8 3.9   BUN 6 - 20 mg/dL 10 10   Creat 0.57 - 1.00 mg/dL 0.74 0.77   Na 134 - 144 mmol/L 138 139   K 3.5 - 5.2 mmol/L 4.5 4.6   Cl 96 - 106 mmol/L 100 99   CO2 18 - 29 mmol/L 23 25   Glucose 65 - 99 mg/dL 92 93     [place] [place] of [address] [place] [address] Ref Rng & Units 10/10/2017   WBC 3.4 - 10.8 x10E3/uL    HGB 11.1 - 15.9 g/dL     - 379 x10E3/uL    AST 0 - 40 IU/L 156 (H)   ALT 0 - 32 IU/L 140 (H)   Alk Phos 39 - 117 IU/L 944 (H)   Bili, Total 0.0 - 1.2 mg/dL 2.3 (H)   Bili, Direct 0.00 - 0.40 mg/dL    Albumin 3.5 - 5.5 g/dL 3.2 (L)   BUN 6 - 20 mg/dL 7   Creat 0.57 - 1.00 mg/dL 0.84   Na 134 - 144 mmol/L 138   K 3.5 - 5.2 mmol/L 4.0   Cl 96 - 106 mmol/L 103   CO2 18 - 29 mmol/L 26   Glucose 65 - 99 mg/dL 91     Additional lab values drawn at today's office visit are pending at the time of documentation. SEROLOGIES:  2/2017. NYASIA positive, ASMA positive, AMA positive.   Serologies Latest Ref Rng & Units 2/21/2018 11/7/2017 6/13/2017   Ferritin 15 - 150 ng/mL 59     Iron % Saturation 15 - 55 20  14 (L)   C-ANCA Neg:<1:20 titer  <1:20    P-ANCA Neg:<1:20 titer  <1:20    ANCA Neg:<1:20 titer  <1:20      LIVER HISTOLOGY:  3/2017. Portal inflammation. Bile duct injury consistent with PBC. No fibrosis. Biopsy slides not reviewed by MLS. ENDOSCOPIC PROCEDURES:  Not available or performed    RADIOLOGY:  2/2017. Ultrasound of liver. Normal appearing liver. No liver mass lesions. 10/2017. Ultrasound of liver. Echogenic consistent with chronic liver disease. No liver mass lesions. No dilated bile ducts. No ascites. No evidence of biliary obstruction or stone. OTHER TESTING:  Not available or performed    ASSESSMENT AND PLAN:  Primary Biliary Cholangitis with no fibrosis. Liver transaminases are recently markedly elevated. Alkaline phosphate is elevated. Liver function is normal.  The platelet count is normal.  These lab values will be repeated today to assess her response to consistent dosing of MARIBELL for the past 6 months. I have advised that if there is a lack of response to this medication, we may need to pursue additional imaging with MRCP or add another medication for improved control of values. This could include Cory Ferraris or participation in clinical trial.  Will review her labs with Dr Charlene Parekh when available. I have outlined to her the administration and side effects of possible new addition of Cory Ferraris and she is willing to proceed with this medication if indicated, she is less willing to participate in clincal trial due to lac of proven response and time commitment requirements. Pruritis. She is doing reasonably well with current use of Periactin and this was refilled today. She will notify me if change in this symptom. Hypertension. Patient has been noted on several past occasions of her elevation in value. This is better at today's office visit.      The patient was directed to continue all current medications at the current dosages. There are no contraindications for the patient to take any medications that are necessary for treatment of other medical issues. The patient was counseled regarding alcohol consumption. The need for vaccination against viral hepatitis A and B will be assessed with serologic and instituted as appropriate. Anemia may be secondary to low iron stores. This has been shown in the past.  I have advised that she should remain on oral supplementation. All of the above issues were discussed with the patient. All questions were answered. The patient expressed a clear understanding of the above. [facility] [place] [address] in 2 months pending review of recent labs. Will plan fibroscan for upcoming appointment.      Aileen Diaz PA-C  [facility] [place] of [address] [place] [address], [address] [address]  22.  [phone]

## 2018-05-30 NOTE — PROGRESS NOTES
1. Have you been to the ER, urgent care clinic since your last visit? Hospitalized since your last visit? No    2. Have you seen or consulted any other health care providers outside of the [address] [place] [address] since your last visit? Include any pap smears or colon screening. No   Chief Complaint   Patient presents with    Follow-up     3 month follow up      Visit Vitals    /87 (BP 1 Location: Right arm, BP Patient Position: Sitting)    Pulse 85    Temp 98.7 °F (37.1 °C) (Tympanic)    Wt 135 lb 12.8 oz (61.6 kg)    SpO2 97%    BMI 26.52 kg/m2     PHQ over the last two weeks 4/10/2017   Little interest or pleasure in doing things Not at all   Feeling down, depressed or hopeless Not at all   Total Score PHQ 2 0     Learning Assessment 5/30/2018   PRIMARY LEARNER Patient   BARRIERS PRIMARY LEARNER NONE   CO-LEARNER CAREGIVER No   PRIMARY LANGUAGE ENGLISH   LEARNER PREFERENCE PRIMARY LISTENING   ANSWERED BY samanta    RELATIONSHIP SELF     Abuse Screening Questionnaire 5/30/2018   Do you ever feel afraid of your partner? N   Are you in a relationship with someone who physically or mentally threatens you? N   Is it safe for you to go home?  Hanna Delgadillo

## 2018-05-31 LAB
ACE SERPL-CCNC: 63 U/L (ref 14–82)
ALBUMIN SERPL-MCNC: 3.6 G/DL (ref 3.5–5.5)
ALP SERPL-CCNC: 420 IU/L (ref 39–117)
ALT SERPL-CCNC: 66 IU/L (ref 0–32)
AST SERPL-CCNC: 76 IU/L (ref 0–40)
BILIRUB DIRECT SERPL-MCNC: 0.81 MG/DL (ref 0–0.4)
BILIRUB SERPL-MCNC: 1.2 MG/DL (ref 0–1.2)
BUN SERPL-MCNC: 12 MG/DL (ref 6–20)
BUN/CREAT SERPL: 18 (ref 9–23)
CALCIUM SERPL-MCNC: 9.2 MG/DL (ref 8.7–10.2)
CHLORIDE SERPL-SCNC: 102 MMOL/L (ref 96–106)
CO2 SERPL-SCNC: 24 MMOL/L (ref 18–29)
CREAT SERPL-MCNC: 0.67 MG/DL (ref 0.57–1)
ERYTHROCYTE [DISTWIDTH] IN BLOOD BY AUTOMATED COUNT: 17.9 % (ref 12.3–15.4)
GFR SERPLBLD CREATININE-BSD FMLA CKD-EPI: 117 ML/MIN/1.73
GFR SERPLBLD CREATININE-BSD FMLA CKD-EPI: 135 ML/MIN/1.73
GLUCOSE SERPL-MCNC: 78 MG/DL (ref 65–99)
HCT VFR BLD AUTO: 32.8 % (ref 34–46.6)
HGB BLD-MCNC: 10.5 G/DL (ref 11.1–15.9)
INR PPP: 1 (ref 0.8–1.2)
MCH RBC QN AUTO: 27.5 PG (ref 26.6–33)
MCHC RBC AUTO-ENTMCNC: 32 G/DL (ref 31.5–35.7)
MCV RBC AUTO: 86 FL (ref 79–97)
PLATELET # BLD AUTO: 510 X10E3/UL (ref 150–379)
POTASSIUM SERPL-SCNC: 4 MMOL/L (ref 3.5–5.2)
PROTHROMBIN TIME: 10.4 SEC (ref 9.1–12)
RBC # BLD AUTO: 3.82 X10E6/UL (ref 3.77–5.28)
SODIUM SERPL-SCNC: 138 MMOL/L (ref 134–144)
WBC # BLD AUTO: 9.1 X10E3/UL (ref 3.4–10.8)

## 2018-07-16 NOTE — PROGRESS NOTES
Multiple attempts to reach patient with results, ALP not at [facility]. We have suggested consideration for addition of Ocaliva. Patient is in [place], [place] for the Summer and is going for consult there in the near future. Follow-up as scheduled in early 8/2018.

## 2018-08-29 ENCOUNTER — OFFICE VISIT (OUTPATIENT)
Dept: HEMATOLOGY | Age: 33
End: 2018-08-29

## 2018-08-29 VITALS
TEMPERATURE: 97 F | HEART RATE: 86 BPM | WEIGHT: 130.2 LBS | DIASTOLIC BLOOD PRESSURE: 89 MMHG | SYSTOLIC BLOOD PRESSURE: 137 MMHG | RESPIRATION RATE: 19 BRPM | OXYGEN SATURATION: 98 % | BODY MASS INDEX: 24.58 KG/M2 | HEIGHT: 61 IN

## 2018-08-29 DIAGNOSIS — K74.3 PRIMARY BILIARY CHOLANGITIS (HCC): Primary | ICD-10-CM

## 2018-08-29 RX ORDER — URSODIOL 300 MG/1
900 CAPSULE ORAL DAILY
Qty: 90 CAP | Refills: 6 | Status: SHIPPED | OUTPATIENT
Start: 2018-08-29 | End: 2018-09-13 | Stop reason: SDUPTHER

## 2018-08-29 NOTE — MR AVS SNAPSHOT
27[address] [facility] 67.[address] [place] [address] 
[phone] Patient: Eddie Manzo MRN: [id number] :1985 Visit Information Date & Time Provider Department Dept. Phone Encounter #  
 2018  9:00 AM Kodi Dahl, [address] [address] [id number] Follow-up Instructions [facility] in about 4 weeks (around 2018[facility]. Your Appointments 2018  1:15 PM  
Follow Up with MD Dimple Mckeon [facility] Appt Note: Pt requested appt before LBx  
 200 [facility] 67[address]56[address] [place] [address]  
[phone]  
  
   
 200 [address] [address]  
  
    
 2018 11:00 AM  
PROCEDURE with MD Dimple Mckeon 75 [facility]) Appt Note: [address] [place] [address]  
[phone]  
  
   
 [address] [address]  
  
    
 2018  9:00 AM  
Follow Up with MD Dimple Mckeon 75 ([facility]) Appt Note: LBX Follow up 200 [facility] 67.56.[address] [place] [address]  
[phone] Upcoming Health Maintenance Date Due Pneumococcal 19-64 Medium Risk (1 of 1 - PPSV23) 2004 DTaP/Tdap/Td series (1 - Tdap) 2006 PAP AKA CERVICAL CYTOLOGY 2006 Influenza Age 5 to Adult 2018 Allergies as of 2018  Review Complete On: 2018 By: Joaquin Campbell LPN Severity Noted Reaction Type Reactions Penicillin G  2017    Angioedema Sulfa (Sulfonamide Antibiotics)  2017    Hives Current Immunizations  Never Reviewed No immunizations on file. Not reviewed this visit You Were Diagnosed With   
  
 Codes Comments Primary biliary cholangitis (HCC)    -  Primary ICD-10-CM: K25.7 ICD-9-CM: 571.6 Vitals BP Pulse Temp Resp Height(growth percentile) Weight(growth percentile) 137/89 (BP 1 Location: Right arm, BP Patient Position: Sitting) 86 97 °F (36.1 °C) (Oral) 19 5' 1\" (1.549 m) 130 lb 3.2 oz (59.1 kg) SpO2 BMI Smoking Status 98% 24.6 kg/m2 Never Smoker Vitals History BMI and BSA Data Body Mass Index Body Surface Area  
 24.6 kg/m 2 1.59 m 2 Preferred Pharmacy Pharmacy Name Phone [facility][id number] [place] Young, [address] [place] [address] [phone] Your Updated Medication List  
  
   
This list is accurate as of 8/29/18 10:11 AM.  Always use your most recent med list.  
  
  
  
  
 cyproheptadine 4 mg tablet Commonly known as:  PERIACTIN Take 1 Tab by mouth two (2) times daily as needed. LEXAPRO 10 mg tablet Generic drug:  escitalopram oxalate Take 10 mg by mouth daily. PROAIR HFA 90 mcg/actuation inhaler Generic drug:  albuterol Take 2 Puffs by inhalation as needed for Wheezing. Indications: Acute Asthma Attack  
  
 ursodiol 300 mg capsule Commonly known as:  ACTIGALL Take 3 Caps by mouth daily. XANAX 0.5 mg tablet Generic drug:  ALPRAZolam  
Take  by mouth. ZyrTEC 10 mg tablet Generic drug:  cetirizine Take  by mouth daily as needed. Prescriptions Sent to Pharmacy Refills  
 ursodiol (ACTIGALL) 300 mg capsule 6 Sig: Take 3 Caps by mouth daily. Class: Normal  
 Pharmacy: [facility]/pharmacy [address], [address] [place] [address] Ph #: [phone] Route: Oral  
  
We Performed the Following ACTIN (SMOOTH MUSCLE) ANTIBODY [id number] CPT(R)] NYASIA, DIRECT, W/REFLEX [id number] CPT(R)] CBC W/O DIFF [02224 CPT(R)] HEPATIC FUNCTION PANEL (6) [[id number] Custom] METABOLIC PANEL, BASIC [45001 CPT(R)] PROTHROMBIN TIME + INR [95538 CPT(R)] Follow-up Instructions Return in about 4 weeks (around 9/26/2018) for Teodoro sims. To-Do List   
 09/05/2018 Imaging:  DEXA BONE DENSITY STUDY AXIAL   
  
 09/28/2018 Imaging:  US GUIDE BX SHANTI PERC Referral Information Referral ID Referred By Referred To  
  
 [id number] Iván EVERETT Not Available Visits Status Start Date End Date 1 New Request 8/29/18 8/29/19 If your referral has a status of pending review or denied, additional information will be sent to support the outcome of this decision. Introducing [facility]! [facility] introduces [employer] patient portal. Now you can access parts of your medical record, email your doctor's office, and request medication refills online. 1. In your internet browser, go to [email or web address] 2. Click on the First Time User? Click Here link in the Sign In box. You will see the New Member Sign Up page. 3. Enter your [employer] Access Code exactly as it appears below. You will not need to use this code after youve completed the sign-up process. If you do not sign up before the expiration date, you must request a new code. · [employer] Access Code: [email or web address] Expires: 11/27/2018 10:11 AM 
 
4. Enter the last four digits of your Social Security Number (xxxx) and Date of Birth (mm/dd/yyyy) as indicated and click Submit. You will be taken to the next sign-up page. 5. Create a [employer] ID. This will be your [employer] login ID and cannot be changed, so think of one that is secure and easy to remember. 6. Create a [employer] password. You can change your password at any time. 7. Enter your Password Reset Question and Answer. This can be used at a later time if you forget your password. 8. Enter your e-mail address. You will receive e-mail notification when new information is available in [address] 9. Click Sign Up. You can now view and download portions of your medical record. 10. Click the Download Summary menu link to download a portable copy of your medical information. If you have questions, please visit the Frequently Asked Questions section of the [employer] website. Remember, [employer] is NOT to be used for urgent needs. For medical emergencies, dial 911. Now available from your iPhone and Android! Please provide this summary of care documentation to your next provider. Your primary care clinician is listed as Marcell July. If you have any questions after today's visit, please call [phone].

## 2018-08-29 NOTE — PROGRESS NOTES
Health Maintenance Due   Topic Date Due    Pneumococcal 19-64 Medium Risk (1 of 1 - PPSV23) 11/26/2004    DTaP/Tdap/Td series (1 - Tdap) 11/26/2006    PAP AKA CERVICAL CYTOLOGY  11/26/2006    Influenza Age 5 to Adult  08/01/2018       Chief Complaint   Patient presents with    Follow Up Chronic Condition     Fibroscan    Dental Problem       1. Have you been to the ER, urgent care clinic since your last visit? Hospitalized since your last visit? Pt first, 8/8/18, Strep Throat. 2. Have you seen or consulted any other health care providers outside of the [address] [place] [address] since your last visit? Include any pap smears or colon screening. No    3) Do you have an Advance Directive on file? no    4) Are you interested in receiving information on Advance Directives? NO      Patient is accompanied by self I have received verbal consent from Addison Simon to discuss any/all medical information while they are present in the room.

## 2018-08-29 NOTE — PROGRESS NOTES
[address] Nur[address] Novak MD, Myra Quintero, Luisa Velia Jj     April Gisel Thompson, YOLANDA Fink MD, Myra Quintero, MD Munira Rodney Patient, NP     Nicci Hernandez, JOSH        [address] [facility], [address] [facility], [address]     [phone]     FAX: [address] [place] [address], [address]     [phone]     FAX: [phone]       Patient Care Team:  Shirley Garcia DO as PCP - General (Family Practice)  Rubi Guerra MD (Gastroenterology)      Problem List  Date Reviewed: 5/30/2018          Codes Class Noted    Primary biliary cholangitis [facility]) ICD-10-CM: K74.3  ICD-9-CM: 571.6  4/10/2017        Depression ICD-10-CM: F32.9  ICD-9-CM: 602  4/10/2017        Asthma ICD-10-CM: J45.909  ICD-9-CM: 493.90  4/10/2017              [facility] for management of Primary Biliary Cholangitis. The patient is a 28 y.o. Black female who was noted to have abnormalities in liver transaminases and alkaline phosphase in 2/2017. Serologic studies were positive for AMA, ASMA and NYASIA. Ultrasound of the liver was performed in 2/2017. The results of the imaging demonstrated a normal appearing liver. A liver biopsy was performed in 3/2017. This demonstrated mild inflammation, bile duct changes consistent with PBC and no fibrosis. She presents today for repeat fibrosis assessment with non-invasive fibroscan. The patient has been given a new prescription for ursodeoxycholic acid and started this medication in early 5/2017. At her last office visit in 6/2017, there had not been much change in her liver transaminases, but she had only been on the new MARIBELL dose of 900 mg daily for 5 weeks.   She had initially reported tolerating reasonably well, but had noted an increase in her baseline symptoms of itching. A prescription for Periactin taken twice a day has proven to be helpful with the itching. Patient relates that over the Summer months of 2017, she was not consistent with administration of MARIBELL on a daily basis. She estimates that she would take her medications 3-4 days of the week. In early 10/2017, she had significant abdominal pain and was concerned and presented first to her PCP and then was sent to the EMD for evaluation. This showed marked increase in her liver enzymes and total bilirubin. She had negative ultrasound at that time. Patient was discharged and advised to resume her MARIBELL which she had stopped taking about a week prior to her hospital evaluation. She reports since 10/10/2017, she has been consistent with administration of the medication. She has been tolerating this reasonably well and past labs have shown a decline in liver enzymes, but we have not yet reached target response. We have considered the addition of Althia Ksenia, this has not yet been started. She has had increased bowel frequency with administration of the MARIBELL, she has better managed this with taking her medications in the evening. Her itching symptoms have continued and she is managing with administration of Periactin. The most recent laboratory studies indicate that the liver transaminases are elevated, ALP is elevated, tests of hepatic synthetic and metabolic function are depressed, and the platelet count is normal.    The patient notes fatigue, dry eyes, dry mouth, itching, and most recently, increases tooth sensitivity. The patient completes all daily activities without any functional limitations. The patient has not experienced yellowing of the eyes or skin, mylagias, arthralgias. Over the Summer months, she was attending classes at [facility] and had an opportunity for consultation with Dr Dione Toney.   After review of her chart, he had recommended consideration of bezafibrate (this medication is not available in the [facility]). ALLERGIES  Allergies   Allergen Reactions    Penicillin G Angioedema    Sulfa (Sulfonamide Antibiotics) Hives       MEDICATIONS  Current Outpatient Prescriptions   Medication Sig    cyproheptadine (PERIACTIN) 4 mg tablet Take 1 Tab by mouth two (2) times daily as needed.  ursodiol (ACTIGALL) 300 mg capsule Take 3 Caps by mouth daily.  cetirizine (ZYRTEC) 10 mg tablet Take  by mouth daily as needed.  ALPRAZolam (XANAX) 0.5 mg tablet Take  by mouth.  albuterol (PROAIR HFA) 90 mcg/actuation inhaler Take 2 Puffs by inhalation as needed for Wheezing. Indications: Acute Asthma Attack    escitalopram oxalate (LEXAPRO) 10 mg tablet Take 10 mg by mouth daily. No current facility-administered medications for this visit. SYSTEM REVIEW NOT RELATED TO LIVER DISEASE OR REVIEWED ABOVE:  Constitution systems: Negative for fever, chills, weight gain, weight loss. Positive for fatigue. Eyes: Negative for visual changes. ENT: Negative for sore throat, painful swallowing. Noted increased dental sensitivity. Respiratory: Negative for cough, hemoptysis, SOB. Cardiology: Negative for chest pain, palpitations. GI:  Negative for constipation or diarrhea. : Negative for urinary frequency, dysuria, hematuria, nocturia. Skin: Negative for rash. Positive for itching. Hematology: Negative for easy bruising, blood clots. Musculo-skeletal: Negative for back pain, muscle pain, weakness. Neurologic: Negative for headaches, dizziness, vertigo, memory problems not related to HE. Psychology: Negative for anxiety, depression. FAMILY HISTORY:  The father has the following chronic diseases: DM and has some sort of liver problem. The mother has the following chronic diseases: CHOL, HTN. There is no family history of immune disorders. SOCIAL HISTORY:  The patient has never been . The patient has no children. The patient has never used tobacco products. The patient consumes alcohol on social occasions never in excess. The patient currently works full time, 3rd . PHYSICAL EXAMINATION:  /89 (BP 1 Location: Right arm, BP Patient Position: Sitting)  Pulse 86  Temp 97 °F (36.1 °C) (Oral)   Resp 19  Ht 5' 1\" (1.549 m)  Wt 130 lb 3.2 oz (59.1 kg)  SpO2 98%  BMI 24.6 kg/m2  General: No acute distress. Eyes: Sclera anicteric. ENT: No oral lesions. Thyroid normal.  Nodes: No adenopathy. Skin: No spider angiomata. No jaundice. No palmar erythema. Respiratory: Lungs clear to auscultation. Cardiovascular: Regular heart rate. No murmurs. No JVD. Abdomen: Soft non-tender. Liver size normal to percussion/palpation. Spleen not palpable. No obvious ascites. Extremities: No edema. No muscle wasting. No gross arthritic changes. Neurologic: Alert and oriented. Cranial nerves grossly intact. No asterixis.     LABORATORY STUDIES:  [facility] [place] of [address] Units 5/30/2018 2/21/2018   WBC 3.4 - 10.8 x10E3/uL 9.1 8.0   HGB 11.1 - 15.9 g/dL 10.5 (L) 11.0 (L)    - 379 x10E3/uL 510 (H) 521 (H)   INR 0.8 - 1.2 1.0    AST 0 - 40 IU/L 76 (H) 78 (H)   ALT 0 - 32 IU/L 66 (H) 88 (H)   Alk Phos 39 - 117 IU/L 420 (H) 470 (H)   Bili, Total 0.0 - 1.2 mg/dL 1.2 1.5 (H)   Bili, Direct 0.00 - 0.40 mg/dL 0.81 (H) 1.03 (H)   Albumin 3.5 - 5.5 g/dL 3.6 3.8   BUN 6 - 20 mg/dL 12 10   Creat 0.57 - 1.00 mg/dL 0.67 0.74   Na 134 - 144 mmol/L 138 138   K 3.5 - 5.2 mmol/L 4.0 4.5   Cl 96 - 106 mmol/L 102 100   CO2 18 - 29 mmol/L 24 23   Glucose 65 - 99 mg/dL 78 92     [facility] [place] of [address] [place] [address] Ref Rng & Units 11/7/2017   WBC 3.4 - 10.8 x10E3/uL 10.2   HGB 11.1 - 15.9 g/dL 10.9 (L)    - 379 x10E3/uL 504 (H)   INR 0.8 - 1.2    AST 0 - 40 IU/L 117 (H)   ALT 0 - 32 IU/L 114 (H)   Alk Phos 39 - 117 IU/L 501 (H)   Bili, Total 0.0 - 1.2 mg/dL 1.7 (H)   Bili, Direct 0.00 - 0.40 mg/dL 1.24 (H)   Albumin 3.5 - 5.5 g/dL 3. 9   BUN 6 - 20 mg/dL 10   Creat 0.57 - 1.00 mg/dL 0.77   Na 134 - 144 mmol/L 139   K 3.5 - 5.2 mmol/L 4.6   Cl 96 - 106 mmol/L 99   CO2 18 - 29 mmol/L 25   Glucose 65 - 99 mg/dL 93   Additional lab values drawn at today's office visit are pending at the time of documentation. SEROLOGIES:  2/2017. NYASIA positive, ASMA positive, AMA positive. Serologies Latest Ref Rng & Units 2/21/2018 11/7/2017 6/13/2017   Ferritin 15 - 150 ng/mL 59     Iron % Saturation 15 - 55 20  14 (L)   C-ANCA Neg:<1:20 titer  <1:20    P-ANCA Neg:<1:20 titer  <1:20    ANCA Neg:<1:20 titer  <1:20      LIVER HISTOLOGY:  3/2017. Portal inflammation. Bile duct injury consistent with PBC. No fibrosis. Biopsy slides not reviewed by MLS.  8/2018. FibroScan performed at [address]. EkPa was 19.7. Suggested fibrosis level is F4. CAP score is 189. ENDOSCOPIC PROCEDURES:  Not available or performed    RADIOLOGY:  2/2017. Ultrasound of liver. Normal appearing liver. No liver mass lesions. 10/2017. Ultrasound of liver. Echogenic consistent with chronic liver disease. No liver mass lesions. No dilated bile ducts. No ascites. No evidence of biliary obstruction or stone. OTHER TESTING:  Not available or performed    ASSESSMENT AND PLAN:  Primary Biliary Cholangitis with questionable progression of fibrosis. Liver transaminases are recently markedly elevated. Alkaline phosphate is elevated. Liver function is normal.  The platelet count is normal.  These lab values will be repeated today to assess her response to consistent dosing of MARIBELL for the past 9 months. As there has been a general lack of response to her more consistent dosing of medications, we had entertained use of Ocaliva.   However, in light of the findings on fibroscan today that suggest much more advanced fibrosis than her prior liver biopsy, will defer start of new medications and plan to proceed with labs today and tentative liver biopsy in the near future. I have advised that repeat biopsy may be indicated to confirm degree of fibrosis and direct future follow-up as well as to identify if there is a component of autoimmune hepatitis overlap. I have reviewed with her the dischordance of her past biopsy and current fibroscan. Patient would like to review her findings further in discussion with Dr Jackie Ahmadi. Will plan to repeat labs today and plan to have her follow-up with him within the month, we have also scheduled her for liver biopsy. Pruritis. She is doing reasonably well with current use of Periactin and this was refilled today. She will notify me if change in this symptom. Hypertension. Patient has been noted on several past occasions of her elevation in value. This is better at today's office visit. The patient was directed to continue all current medications at the current dosages. There are no contraindications for the patient to take any medications that are necessary for treatment of other medical issues. The patient was counseled regarding alcohol consumption. The need for vaccination against viral hepatitis A and B will be assessed with serologic and instituted as appropriate. Anemia may be secondary to low iron stores. This has been shown in the past.  I have advised that she should remain on oral supplementation. All of the above issues were discussed with the patient. All questions were answered. The patient expressed a clear understanding of the above. Follow-up [address] in one month for review of findings with Dr Jackie Ahmadi, will tentatively plan on liver biopsy to follow.       Yana Bae PA-C  [facility] [place] of [address] [place] [address], [address] [facility], [facility].  [phone]

## 2018-08-30 LAB
ACTIN IGG SERPL-ACNC: 15 UNITS (ref 0–19)
ALBUMIN SERPL-MCNC: 3.7 G/DL (ref 3.5–5.5)
ALP SERPL-CCNC: 565 IU/L (ref 39–117)
ALT SERPL-CCNC: 128 IU/L (ref 0–32)
ANA SER QL: POSITIVE
AST SERPL-CCNC: 114 IU/L (ref 0–40)
BILIRUB DIRECT SERPL-MCNC: 1.06 MG/DL (ref 0–0.4)
BILIRUB SERPL-MCNC: 1.5 MG/DL (ref 0–1.2)
BUN SERPL-MCNC: 9 MG/DL (ref 6–20)
BUN/CREAT SERPL: 10 (ref 9–23)
CALCIUM SERPL-MCNC: 9.2 MG/DL (ref 8.7–10.2)
CENTROMERE B AB SER-ACNC: >8 AI (ref 0–0.9)
CHLORIDE SERPL-SCNC: 101 MMOL/L (ref 96–106)
CHROMATIN AB SERPL-ACNC: 1 AI (ref 0–0.9)
CO2 SERPL-SCNC: 21 MMOL/L (ref 20–29)
CREAT SERPL-MCNC: 0.88 MG/DL (ref 0.57–1)
DSDNA AB SER-ACNC: 1 IU/ML (ref 0–9)
ENA JO1 AB SER-ACNC: <0.2 AI (ref 0–0.9)
ENA RNP AB SER-ACNC: <0.2 AI (ref 0–0.9)
ENA SCL70 AB SER-ACNC: <0.2 AI (ref 0–0.9)
ENA SM AB SER-ACNC: <0.2 AI (ref 0–0.9)
ENA SS-A AB SER-ACNC: 0.3 AI (ref 0–0.9)
ENA SS-B AB SER-ACNC: <0.2 AI (ref 0–0.9)
ERYTHROCYTE [DISTWIDTH] IN BLOOD BY AUTOMATED COUNT: 19.1 % (ref 12.3–15.4)
GLUCOSE SERPL-MCNC: 101 MG/DL (ref 65–99)
HCT VFR BLD AUTO: 33.6 % (ref 34–46.6)
HGB BLD-MCNC: 10.5 G/DL (ref 11.1–15.9)
INR PPP: 1 (ref 0.8–1.2)
MCH RBC QN AUTO: 26.9 PG (ref 26.6–33)
MCHC RBC AUTO-ENTMCNC: 31.3 G/DL (ref 31.5–35.7)
MCV RBC AUTO: 86 FL (ref 79–97)
PLATELET # BLD AUTO: 488 X10E3/UL (ref 150–379)
POTASSIUM SERPL-SCNC: 4.2 MMOL/L (ref 3.5–5.2)
PROTHROMBIN TIME: 10.3 SEC (ref 9.1–12)
RBC # BLD AUTO: 3.9 X10E6/UL (ref 3.77–5.28)
SEE BELOW, 164869: ABNORMAL
SODIUM SERPL-SCNC: 138 MMOL/L (ref 134–144)
WBC # BLD AUTO: 8 X10E3/UL (ref 3.4–10.8)

## 2018-08-31 NOTE — PROGRESS NOTES
Pt notified via letter of findings, continue with MARIBELL and maintain consultation with Dr Lazarus Garcia vs liver biopsy.

## 2018-09-13 DIAGNOSIS — K74.3 PRIMARY BILIARY CHOLANGITIS (HCC): ICD-10-CM

## 2018-09-13 NOTE — TELEPHONE ENCOUNTER
Requested Prescriptions     Pending Prescriptions Disp Refills    ursodiol (ACTIGALL) 300 mg capsule 90 Cap 6     Sig: Take 3 Caps by mouth daily.

## 2018-09-14 RX ORDER — URSODIOL 300 MG/1
900 CAPSULE ORAL DAILY
Qty: 90 CAP | Refills: 6 | Status: SHIPPED | OUTPATIENT
Start: 2018-09-14

## 2018-09-21 ENCOUNTER — OFFICE VISIT (OUTPATIENT)
Dept: HEMATOLOGY | Age: 33
End: 2018-09-21

## 2018-09-21 VITALS
BODY MASS INDEX: 25 KG/M2 | HEIGHT: 61 IN | OXYGEN SATURATION: 98 % | TEMPERATURE: 98.8 F | WEIGHT: 132.4 LBS | DIASTOLIC BLOOD PRESSURE: 106 MMHG | HEART RATE: 90 BPM | SYSTOLIC BLOOD PRESSURE: 129 MMHG

## 2018-09-21 DIAGNOSIS — K74.3 PRIMARY BILIARY CHOLANGITIS (HCC): Primary | ICD-10-CM

## 2018-09-21 NOTE — PROGRESS NOTES
1. Have you been to the ER, urgent care clinic since your last visit? Hospitalized since your last visit? No    2. Have you seen or consulted any other health care providers outside of the [facility] since your last visit? Include any pap smears or colon screening. No     Chief Complaint   Patient presents with    Follow-up     Visit Vitals    BP (!) 129/106 (BP 1 Location: Left arm, BP Patient Position: Sitting)    Pulse 90    Temp 98.8 °F (37.1 °C) (Tympanic)    Ht 5' 1\" (1.549 m)    Wt 132 lb 6.4 oz (60.1 kg)    SpO2 98%    BMI 25.02 kg/m2     Learning Assessment 9/21/2018   PRIMARY LEARNER Patient   BARRIERS PRIMARY LEARNER NONE   CO-LEARNER CAREGIVER -   PRIMARY LANGUAGE ENGLISH   LEARNER PREFERENCE PRIMARY DEMONSTRATION   ANSWERED BY patient   RELATIONSHIP SELF     Abuse Screening Questionnaire 9/21/2018   Do you ever feel afraid of your partner? N   Are you in a relationship with someone who physically or mentally threatens you? N   Is it safe for you to go home?  Jose Carlos Mckeon

## 2018-09-21 NOTE — MR AVS SNAPSHOT
[address] .67.56.31 [address] [place] [address] 
[phone] Patient: Junior Carmen MRN: [id number] :1985 Visit Information Date & Time Provider Department Dept. Phone Encounter #  
 2018  1:15 PM Ruth Sandhu [address] [place] [address] [id number] Your Appointments 2018 11:00 AM  
PROCEDURE with MD Dimple Sandhu [address] [place] [address]) Appt Note: [address] .67.56.31 1400 [facility]  
[phone]  
  
   
 [address] [facility] 505 [facility] [address]  
  
    
 2018  9:00 AM  
Follow Up with MD Dimple Sandhu 75 ([facility]) Appt Note: [facility] Follow up [address] [place] .67.56.31 [facility] [address]  
59 [facility] [address] Upcoming Health Maintenance Date Due Pneumococcal 19-64 Medium Risk (1 of 1 - PPSV23) 2004 DTaP/Tdap/Td series (1 - Tdap) 2006 PAP AKA CERVICAL CYTOLOGY 2006 Influenza Age 5 to Adult 2018 Allergies as of 2018  Review Complete On: 2018 By: Stevie Asencio Severity Noted Reaction Type Reactions Penicillin G  2017    Angioedema Sulfa (Sulfonamide Antibiotics)  2017    Hives Current Immunizations  Never Reviewed No immunizations on file. Not reviewed this visit Vitals BP Pulse Temp Height(growth percentile) (!) 129/106 (BP 1 Location: Left arm, BP Patient Position: Sitting) 90 98.8 °F (37.1 °C) (Tympanic) 5' 1\" (1.549 m) Weight(growth percentile) SpO2 BMI Smoking Status 132 lb 6.4 oz (60.1 kg) 98% 25.02 kg/m2 Never Smoker BMI and BSA Data Body Mass Index Body Surface Area 25.02 kg/m 2 1.61 m 2 Preferred Pharmacy Pharmacy Name Phone  [id number]- Josie Morales, [address] [facility] [phone] Your Updated Medication List  
  
   
This list is accurate as of 9/21/18  2:44 PM.  Always use your most recent med list.  
  
  
  
  
 cyproheptadine 4 mg tablet Commonly known as:  PERIACTIN Take 1 Tab by mouth two (2) times daily as needed. LEXAPRO 10 mg tablet Generic drug:  escitalopram oxalate Take 10 mg by mouth daily. PROAIR HFA 90 mcg/actuation inhaler Generic drug:  albuterol Take 2 Puffs by inhalation as needed for Wheezing. Indications: Acute Asthma Attack  
  
 ursodiol 300 mg capsule Commonly known as:  ACTIGALL Take 3 Caps by mouth daily. XANAX 0.5 mg tablet Generic drug:  ALPRAZolam  
Take  by mouth daily as needed. ZyrTEC 10 mg tablet Generic drug:  cetirizine Take  by mouth daily as needed. Introducing [facility]! Roxane Blue introduces [employer] patient portal. Now you can access parts of your medical record, email your doctor's office, and request medication refills online. 1. In your internet browser, go to [email or web address] 2. Click on the First Time User? Click Here link in the Sign In box. You will see the New Member Sign Up page. 3. Enter your [employer] Access Code exactly as it appears below. You will not need to use this code after youve completed the sign-up process. If you do not sign up before the expiration date, you must request a new code. · [employer] Access Code: [email or web address] Expires: 11/27/2018 10:11 AM 
 
4. Enter the last four digits of your Social Security Number (xxxx) and Date of Birth (mm/dd/yyyy) as indicated and click Submit. You will be taken to the next sign-up page. 5. Create a [employer] ID. This will be your [employer] login ID and cannot be changed, so think of one that is secure and easy to remember. 6. Create a [employer] password. You can change your password at any time. 7. Enter your Password Reset Question and Answer.  This can be used at a later time if you forget your password. 8. Enter your e-mail address. You will receive e-mail notification when new information is available in [address] 9. Click Sign Up. You can now view and download portions of your medical record. 10. Click the Download Summary menu link to download a portable copy of your medical information. If you have questions, please visit the Frequently Asked Questions section of the [employer] website. Remember, [employer] is NOT to be used for urgent needs. For medical emergencies, dial 911. Now available from your iPhone and Android! Please provide this summary of care documentation to your next provider. Your primary care clinician is listed as Josselyn Damico. If you have any questions after today's visit, please call [phone].

## 2018-09-21 NOTE — PROGRESS NOTES
[address] Margarita, [address], [place] [place] [place], [facility]       Andrei Bruno, JOSH Holliday, YOLANDA Ceja, [facility]   JOSH Zavaleta NP [address]    at [address], [address] [place] [address], [facility] 22.    [phone]    FAX: [email or web address]    at [address], [address]    [phone]    FAX: [phone]         Patient Care Team:  Leigh Jewell DO as PCP - [facility])  Micaela Clark MD (Gastroenterology)      Problem List  Date Reviewed: 8/29/2018          Codes Class Noted    Primary biliary cholangitis [facility]) ICD-10-CM: K74.3  ICD-9-CM: 571.6  4/10/2017        Depression ICD-10-CM: F32.9  ICD-9-CM: 996  4/10/2017        Asthma ICD-10-CM: J45.909  ICD-9-CM: 493.90  4/10/2017              Awilda Gale returns to the [address] for management of Primary Biliary Cholangitis. The active problem list, all pertinent past medical history, medications, liver histology, radiologic findings and laboratory findings related to the liver disorder were reviewed with the patient. The patient is a 28 y.o. Black female who was noted to have abnormalities in liver transaminases and alkaline phosphase in 2/2017. Serologic studies were positive for AMA, ASMA and NYASIA. A liver biopsy was performed in 3/2017. 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 Assessment of liver fibrosis with Fibroscan in 8/2018 was 19.7 kPa which suggests cirrhosis. She comes in today to review the case and to help her understand what to do to treat her disease.     The patient notes fatigue, dry eyes, dry mouth, itching, and most recently and changes to her teeth.    The patient has not experienced yellowing of the eyes or skin, mylagias, arthralgias. The patient completes all daily activities without any functional limitations. I spent 1 hour discussing with her the natural history of PBC, treatment options including OCA and fibrates. Benzofibrate which has been shown to be benifical in  studies is not available in the [place] (only fenofibrate). Discussed enrolling in clinical trials for treatment of PBC. Discussed issues related to liver transplant. Discussed her symptoms and increased risk of itching with OCA. I reviewed the liver iopsy slides which I have not done previously. I agree with diagnosis of PBC, but feel there is more fibrosis than reporated by pathology. All of the issues listed in the Assessment and Plan were discussed with the patient. All questions were answered. The patient expressed a clear understanding of the above. [facility] 87 1 month after starting [employer]. ASSESSMENT AND PLAN:  Primary Biliary Cholangitis  The diagnosis is based upon liver biopsy, serology and an elevation in ALP. The biopsy shows stage 2 fibrosis. Liver transaminases are elevated. ALP is elevated. Liver function is normal.  The platelet count is normal.      The patient is being treated with MARIBELL 1000 mg every day  She is tolerating the treatment well. There is no diarrhea. There is itching. The patient is not responding to treatment. The ALP has not come down with treatment. Will add OCA to the regimen. Itching   This is secondary to the underlying liver disease. This is being treated with Periactin up to TID as needed  This treatment is not controlling the itching. She does not want to add any other agents at this time. Will add Rifampin if develops itching on OCA.     Treatment of other medical problems in patients with chronic liver disease  There are no contraindications for the patient to take any medications that are necessary for treatment of other medical issues. The patient does not consume alcohol daily. Normal doses of acetaminophen can be used for pain as needed. Normal doses of acetaminophen as recommended on the label are not hepatotoxic, even in patients with cirrhosis. The patient does not have cirrhosis. Normal doses of NSAIDs can be used for pain as needed. Counseling for alcohol in patients with chronic liver disease  The patient was counseled regarding alcohol consumption and the effect of alcohol on chronic liver disease. The patient does not consume any significant amount of alcohol. Osteoporosis  The risk of osteoporosis is increased in patients with PBC. DEXA bone density to assess for osteoporosis has been ordered. The patient is taking calcium supplementation and Vitamin D.      PBC also leads to teeth decay. The patient was advised to see the dentist every 4 months. Vaccinations   The need for vaccination against viral hepatitis A and B will be assessed with serologic and instituted as appropriate. Since the patient does not have a chronic liver disease which can lead to liver injury screening for HAV and HBV is not needed. Routine vaccinations against other bacterial and viral agents can be performed as indicated. Annual flu vaccination should be administered if indicated. ALLERGIES  Allergies   Allergen Reactions    Penicillin G Angioedema    Sulfa (Sulfonamide Antibiotics) Hives       MEDICATIONS  Current Outpatient Prescriptions   Medication Sig    ursodiol (ACTIGALL) 300 mg capsule Take 3 Caps by mouth daily.  cyproheptadine (PERIACTIN) 4 mg tablet Take 1 Tab by mouth two (2) times daily as needed.  cetirizine (ZYRTEC) 10 mg tablet Take  by mouth daily as needed.  ALPRAZolam (XANAX) 0.5 mg tablet Take  by mouth daily as needed.  albuterol (PROAIR HFA) 90 mcg/actuation inhaler Take 2 Puffs by inhalation as needed for Wheezing. Indications: Acute Asthma Attack    escitalopram oxalate (LEXAPRO) 10 mg tablet Take 10 mg by mouth daily. No current facility-administered medications for this visit. SYSTEM REVIEW NOT RELATED TO LIVER DISEASE OR REVIEWED ABOVE:  Constitution systems: Negative for fever, chills, weight gain, weight loss. Positive for fatigue. Eyes: Negative for visual changes. ENT: Negative for sore throat, painful swallowing. Noted increased dental sensitivity. Respiratory: Negative for cough, hemoptysis, SOB. Cardiology: Negative for chest pain, palpitations. GI:  Negative for constipation or diarrhea. : Negative for urinary frequency, dysuria, hematuria, nocturia. Skin: Negative for rash. Positive for itching. Hematology: Negative for easy bruising, blood clots. Musculo-skeletal: Negative for back pain, muscle pain, weakness. Neurologic: Negative for headaches, dizziness, vertigo, memory problems not related to HE. Psychology: Negative for anxiety, depression. FAMILY HISTORY:  The father has the following chronic diseases: DM and has some sort of liver problem. The mother has the following chronic diseases: CHOL, HTN. There is no family history of immune disorders. SOCIAL HISTORY:  The patient has never been . The patient has no children. The patient has never used tobacco products. The patient consumes alcohol on social occasions never in excess. The patient currently works full time, 3rd . PHYSICAL EXAMINATION:  BP (!) 129/106 (BP 1 Location: Left arm, BP Patient Position: Sitting)  Pulse 90  Temp 98.8 °F (37.1 °C) (Tympanic)   Ht 5' 1\" (1.549 m)  Wt 132 lb 6.4 oz (60.1 kg)  SpO2 98%  BMI 25.02 kg/m2  General: No acute distress. Eyes: Sclera anicteric. ENT: No oral lesions. Thyroid normal.  Nodes: No adenopathy. Skin: No spider angiomata. No jaundice. No palmar erythema. Respiratory: Lungs clear to auscultation. Cardiovascular: Regular heart rate. No murmurs. No JVD. Abdomen: Soft non-tender. Liver size normal to percussion/palpation. Spleen not palpable. No obvious ascites. Extremities: No edema. No muscle wasting. No gross arthritic changes. Neurologic: Alert and oriented. Cranial nerves grossly intact. No asterixis. LABORATORY STUDIES:  Liver [place] of [address] Units 8/29/2018 5/30/2018   WBC 3.4 - 10.8 x10E3/uL 8.0 9.1   HGB 11.1 - 15.9 g/dL 10.5 (L) 10.5 (L)    - 379 x10E3/uL 488 (H) 510 (H)   INR 0.8 - 1.2 1.0 1.0   AST 0 - 40 IU/L 114 (H) 76 (H)   ALT 0 - 32 IU/L 128 (H) 66 (H)   Alk Phos 39 - 117 IU/L 565 (H) 420 (H)   Bili, Total 0.0 - 1.2 mg/dL 1.5 (H) 1.2   Bili, Direct 0.00 - 0.40 mg/dL 1.06 (H) 0.81 (H)   Albumin 3.5 - 5.5 g/dL 3.7 3.6   BUN 6 - 20 mg/dL 9 12   Creat 0.57 - 1.00 mg/dL 0.88 0.67   Na 134 - 144 mmol/L 138 138   K 3.5 - 5.2 mmol/L 4.2 4.0   Cl 96 - 106 mmol/L 101 102   CO2 20 - 29 mmol/L 21 24   Glucose 65 - 99 mg/dL 101 (H) 78     SEROLOGIES:  2/2017. NYASIA positive, ASMA positive, AMA positive. Serologies Latest Ref Rng & Units 2/21/2018 11/7/2017 6/13/2017   Ferritin 15 - 150 ng/mL 59     Iron % Saturation 15 - 55 20  14 (L)   C-ANCA Neg:<1:20 titer  <1:20    P-ANCA Neg:<1:20 titer  <1:20    ANCA Neg:<1:20 titer  <1:20      LIVER HISTOLOGY:  3/2017. Slides reviewed by MLS. Moderate portal inflammation. Bile duct injury consistent with PBC. Florid bile duct lesions. No steatosis. Portal and septal stage 2 fibrosis. 8/2018. Fib[facility]Scan [facility] at [facility]. EkPa was 19.7. Suggested fibrosis level is F4. CAP score is 189. ENDOSCOPIC PROCEDURES:  Not available or performed    RADIOLOGY:  2/2017. Ultrasound of liver. Normal appearing liver. No liver mass lesions. 10/2017. Ultrasound of liver. Echogenic consistent with chronic liver disease. No liver mass lesions. No dilated bile ducts. No ascites.   No evidence of biliary obstruction or stone.     OTHER TESTING:  Not available or performed      Jesse Burden MD  Liver [place] [facility] of [address] [id number] Le[address]  [address] [facility] 22.  [phone]